# Patient Record
Sex: FEMALE | Race: WHITE | Employment: FULL TIME | URBAN - METROPOLITAN AREA
[De-identification: names, ages, dates, MRNs, and addresses within clinical notes are randomized per-mention and may not be internally consistent; named-entity substitution may affect disease eponyms.]

---

## 2017-12-01 ENCOUNTER — OFFICE VISIT (OUTPATIENT)
Dept: INTERNAL MEDICINE CLINIC | Age: 57
End: 2017-12-01

## 2017-12-01 VITALS
RESPIRATION RATE: 14 BRPM | OXYGEN SATURATION: 98 % | DIASTOLIC BLOOD PRESSURE: 64 MMHG | BODY MASS INDEX: 21.52 KG/M2 | HEART RATE: 83 BPM | WEIGHT: 114 LBS | HEIGHT: 61 IN | SYSTOLIC BLOOD PRESSURE: 104 MMHG | TEMPERATURE: 98.8 F

## 2017-12-01 DIAGNOSIS — R19.09 LEFT GROIN MASS: ICD-10-CM

## 2017-12-01 DIAGNOSIS — E06.3 HYPOTHYROIDISM DUE TO HASHIMOTO'S THYROIDITIS: ICD-10-CM

## 2017-12-01 DIAGNOSIS — Z12.39 SCREENING FOR BREAST CANCER: Primary | ICD-10-CM

## 2017-12-01 DIAGNOSIS — Z00.00 ROUTINE GENERAL MEDICAL EXAMINATION AT A HEALTH CARE FACILITY: ICD-10-CM

## 2017-12-01 DIAGNOSIS — E03.8 HYPOTHYROIDISM DUE TO HASHIMOTO'S THYROIDITIS: ICD-10-CM

## 2017-12-01 DIAGNOSIS — M81.0 OSTEOPOROSIS, UNSPECIFIED OSTEOPOROSIS TYPE, UNSPECIFIED PATHOLOGICAL FRACTURE PRESENCE: ICD-10-CM

## 2017-12-01 PROBLEM — K90.0 CELIAC DISEASE: Status: ACTIVE | Noted: 2017-12-01

## 2017-12-01 PROBLEM — F41.1 GENERALIZED ANXIETY DISORDER: Status: ACTIVE | Noted: 2017-12-01

## 2017-12-01 RX ORDER — LEVOTHYROXINE SODIUM 25 UG/1
50 TABLET ORAL
COMMUNITY
Start: 2017-11-08

## 2017-12-01 RX ORDER — BISMUTH SUBSALICYLATE 262 MG
1 TABLET,CHEWABLE ORAL DAILY
COMMUNITY
End: 2018-10-01 | Stop reason: ALTCHOICE

## 2017-12-01 NOTE — PROGRESS NOTES
Patient's identity verified with two patient identifiers (name and date of birth). 1. Have you been to the ER, urgent care clinic since your last visit? Hospitalized since your last visit? No  2. Have you seen or consulted any other health care providers outside of the [address] [place] [address] since your last visit? Include any pap smears or colon screening. No    Chief Complaint   Patient presents with    New Patient     Moved from [place] to [address] [facility] 8/2017, Saw [facility], Med release signed.  Groin Swelling     \"Lump\", x3wks, size of a grape per patient. States it is hard and doesn't move. Pain at first but now nothing. Denies redness at site. Had sebaceous cyst on back, x15yrs ago, and ganglion cyst x30yrs ago. Denies changes in BM/appetite. Not fasting. Health Maintenance Due   Topic Date Due    Hepatitis C Screening  Unsure of this has been done. 1960    DTaP/Tdap/Td series (1 - Tdap)  Has had within 10 yrs, will have records sent. 09/10/1980    PAP AKA CERVICAL CYTOLOGY   Due, aware, needs to establish. 09/10/1980    BREAST CANCER SCRN MAMMOGRAM   Due, aware, needs to establish. 09/10/2009    FOBT Q 1 YEAR AGE 50-75  09/10/2009    Influenza Age 9 to Adult   Has not had- declines. 08/01/2017       Saw [facility], [phone]. Reviewed record in preparation for visit and have obtained necessary documentation.     Wt Readings from Last 3 Encounters:   12/01/17 114 lb (51.7 kg)     Temp Readings from Last 3 Encounters:   12/01/17 98.8 °F (37.1 °C) (Oral)     BP Readings from Last 3 Encounters:   12/01/17 104/64     Pulse Readings from Last 3 Encounters:   12/01/17 83     Learning Assessment:  :     Learning Assessment 12/1/2017   PRIMARY LEARNER Patient   HIGHEST LEVEL OF EDUCATION - PRIMARY LEARNER  4 YEARS OF COLLEGE   BARRIERS PRIMARY LEARNER NONE   CO-LEARNER CAREGIVER No   PRIMARY LANGUAGE ENGLISH   LEARNER PREFERENCE PRIMARY VIDEOS   ANSWERED BY patient RELATIONSHIP SELF       Depression Screening:  :     PHQ over the last two weeks 12/1/2017   Little interest or pleasure in doing things Not at all   Feeling down, depressed or hopeless Not at all   Total Score PHQ 2 0       Fall Risk Assessment:  :     No flowsheet data found. Abuse Screening:  :     Abuse Screening Questionnaire 12/1/2017   Do you ever feel afraid of your partner? N   Are you in a relationship with someone who physically or mentally threatens you? N   Is it safe for you to go home? Y     Patient is accompanied by daughter I have received verbal consent from Shruthi Jarquin to discuss any/all medical information while they are present in the room.

## 2017-12-01 NOTE — MR AVS SNAPSHOT
Visit Information Date & Time Provider Department Dept. Phone Encounter #  
 12/1/2017  2:00 PM LLUVIA Burroughs  [facility] [phone] [id number] Follow-up Instructions Return in about 3 months (around 3/1/2018) for f/u groin lump. Upcoming Health Maintenance Date Due Hepatitis C Screening 1960 DTaP/Tdap/Td series (1 - Tdap) 9/10/1980 PAP AKA CERVICAL CYTOLOGY 9/10/1980 BREAST CANCER SCRN MAMMOGRAM 9/10/2009 FOBT Q 1 YEAR AGE 50-75 9/10/2009 Influenza Age 5 to Adult 8/1/2017 Allergies as of 12/1/2017  Review Complete On: 12/1/2017 By: Rachel Mena Severity Noted Reaction Type Reactions Penicillins Medium 12/01/2017   Side Effect Hives Wheat Low 12/01/2017   Side Effect Unknown (comments) Barely, rye Current Immunizations  Never Reviewed No immunizations on file. Not reviewed this visit You Were Diagnosed With   
  
 Codes Comments Screening for breast cancer    -  Primary ICD-10-CM: Z12.31 
ICD-9-CM: V76.10 Left groin mass     ICD-10-CM: R19.09 
ICD-9-CM: 789.39 Routine general medical examination at a health care facility     ICD-10-CM: Z00.00 ICD-9-CM: V70.0 Vitals BP Pulse Temp Resp Height(growth percentile) Weight(growth percentile) 104/64 (BP 1 Location: Left arm, BP Patient Position: Sitting) 83 98.8 °F (37.1 °C) (Oral) 14 5' 0.5\" (1.537 m) 114 lb (51.7 kg) SpO2 BMI OB Status Smoking Status 98% 21.9 kg/m2 Postmenopausal Former Smoker Vitals History BMI and BSA Data Body Mass Index Body Surface Area  
 21.9 kg/m 2 1.49 m 2 Preferred Pharmacy Pharmacy Name Phone [address] [phone] Your Updated Medication List  
  
   
This list is accurate as of: 12/1/17  2:42 PM.  Always use your most recent med list.  
  
  
  
  
 CALCIUM + D PO  
 Take  by mouth. Osteocon  
  
 multivitamin tablet Commonly known as:  ONE A DAY Take 1 Tab by mouth daily. synthroid 25 mcg tablet Generic drug:  levothyroxine We Performed the Following CBC WITH AUTOMATED DIFF [10409 CPT(R)] METABOLIC PANEL, COMPREHENSIVE [26411 CPT(R)] TSH RFX ON ABNORMAL TO FREE T4 [[id number] Custom] Follow-up Instructions Return in about 3 months (around 3/1/2018) for f/u groin lump. To-Do List   
 12/07/2017 Imaging:  CT ABD PELV W CONT   
  
 12/13/2017 Imaging:  [facility] MAMMO BI SCREENING INCL CAD Patient Instructions 1. Schedule your CT: Abdomen/Pelvis to evaluate groin lump 2. Schedule your mammogram 
 
 
Yesenia 73: Address: [facility], [address] Phone: [phone] Introducing [facility]! [facility] introduces [facility] patient portal. Now you can access parts of your medical record, email your doctor's office, and request medication refills online. 1. In your internet browser, go to [email or web address] 2. Click on the First Time User? Click Here link in the Sign In box. You will see the New Member Sign Up page. 3. Enter your [facility] Access Code exactly as it appears below. You will not need to use this code after youve completed the sign-up process. If you do not sign up before the expiration date, you must request a new code. · [facility] Access Code: [email or web address] Expires: 3/1/2018  2:36 PM 
 
4. Enter the last four digits of your Social Security Number (xxxx) and Date of Birth (mm/dd/yyyy) as indicated and click Submit. You will be taken to the next sign-up page. 5. Create a [facility] ID. This will be your [facility] login ID and cannot be changed, so think of one that is secure and easy to remember. 6. Create a [employer] password. You can change your password at any time. 7. Enter your Password Reset Question and Answer. This can be used at a later time if you forget your password. 8. Enter your e-mail address. You will receive e-mail notification when new information is available in [address] 9. Click Sign Up. You can now view and download portions of your medical record. 10. Click the Download Summary menu link to download a portable copy of your medical information. If you have questions, please visit the Frequently Asked Questions section of the [employer] website. Remember, [facility] is NOT to be used for urgent needs. For medical emergencies, dial 911. Now available from your iPhone and Android! Please provide this summary of care documentation to your next provider. If you have any questions after today's visit, please call [phone].

## 2017-12-01 NOTE — PROGRESS NOTES
Subjective:      César Ocasio is a 61 y.o. female who presents today to establish care and for evaluation of groin lump     Recently moved here for insurance- works in [place] for [address] [employer]:  Decreased short term memory  levothyroxine 25mcg daily  Last TSH in 04/2017    Celiac Disease:  Avoids gluten  No blood in stool  Colonoscopy 8 years ago- few polyps were removed, recommended 10 years    Anxiety:  No meds, feels okay without meds  Intermittent, will get \"foggy and mild palpitations\"    Is due for a mammogram.  No family h/o breast cancer. Last mammogram about 1 year ago  Last Pap: ~ 2 years ago. No h/o abnormal paps    Groin Lump:  Spent 10 days in [place] about 1 month ago  Noticed L inguinal mass once she returned from [place]   nontender  Has not gotten larger since first noticed  No other lumps  Bite on L leg, healing, was on the beach, pt wondering if related    No recent weight loss or weight gain, no night sweats, no chest pain, palpitaitons, dyspnea, cough, abdominal pain, change in bowels, blood in stool, difficulty urinating, HAs or dizziness    Patient Active Problem List    Diagnosis Date Noted    Hashimoto's disease 12/01/2017    Celiac disease 12/01/2017    Generalized anxiety disorder 12/01/2017    Osteoporosis 12/01/2017     Current Outpatient Prescriptions   Medication Sig Dispense Refill    SYNTHROID 25 mcg tablet       multivitamin (ONE A DAY) tablet Take 1 Tab by mouth daily.  CALCIUM CARBONATE/VITAMIN D3 (CALCIUM + D PO) Take  by mouth. Osteocon          Review of Systems    Pertinent items are noted in HPI. Objective:     Visit Vitals    /64 (BP 1 Location: Left arm, BP Patient Position: Sitting)    Pulse 83    Temp 98.8 °F (37.1 °C) (Oral)    Resp 14    Ht 5' 0.5\" (1.537 m)    Wt 114 lb (51.7 kg)    SpO2 98%    BMI 21.9 kg/m2     General:  Alert, cooperative, no distress, appears stated age.    Head:  Normocephalic, without obvious abnormality, atraumatic. Eyes:  Conjunctivae/corneas clear. PERRL, EOMs intact. Fundi benign. Ears:  Normal TMs and external ear canals both ears. Nose: Nares normal. Septum midline. Mucosa normal. No drainage or sinus tenderness. Throat: Lips, mucosa, and tongue normal. Teeth and gums normal.   Neck: Supple, symmetrical, trachea midline, no adenopathy, thyroid: no enlargement/tenderness/nodules, no JVD. Back:   Symmetric, no curvature. thin   Lungs:   Clear to auscultation bilaterally. Chest wall:  No tenderness or deformity. Heart:  Regular rate and rhythm, S1, S2 normal, no murmur, click, rub or gallop. Breast Exam:  No tenderness, masses, or nipple abnormality. Abdomen:   Soft, non-tender. Bowel sounds normal. No masses,  No organomegaly. L inguinal area with very hard mass~ 2cm x 3cm   Genitalia:  L inguinal area with very hard mass~ 2cm x 3cm   Extremities: Extremities normal, atraumatic, no cyanosis or edema. Pulses: 2+ and symmetric all extremities. Skin: Skin color, texture, turgor normal. No rashes or lesions. Lymph nodes: Cervical, supraclavicular, and axillary nodes normal.  L inguinal area with very hard mass~ 2cm x 3cm   Neurologic: CNII-XII intact. Normal strength, sensation  throughout. Assessment/Plan:     1. Left groin mass  - Dr. Tim Thompson evaluated as well, Discussed plan with Dr. Alexandra Dwyer ordered, encouraged pt to schedule asap for next week  - CT ABD PELV W CONT; Future    2. Routine general medical examination at a health care facility  - CBC WITH AUTOMATED DIFF  - METABOLIC PANEL, COMPREHENSIVE  - TSH RFX ON ABNORMAL TO FREE T4  - multivitamin (ONE A DAY) tablet; Take 1 Tab by mouth daily. - CALCIUM CARBONATE/VITAMIN D3 (CALCIUM + D PO); Take  by mouth. Osteocon    3. Screening for breast cancer  - encouraged pt to call and schedule mammogram  - [facility] MAMMO BI SCREENING INCL CAD; Future    4. Hypothyroidism  - SYNTHROID 25 mcg tablet;   - TSH RFX ON ABNORMAL TO FREE T4    5. Osteoporosis:  - CALCIUM CARBONATE/VITAMIN D3 (CALCIUM + D PO); Take  by mouth. Erica Client  - will need DEXA but did not address this today given other more pressing issues. Will address at future visits as appropriate      Advised her to call back or return to office if symptoms worsen/change/persist.  Discussed expected course/resolution/complications of diagnosis in detail with patient. Medication risks/benefits/costs/interactions/alternatives discussed with patient. She was given an after visit summary which includes diagnoses, current medications, & vitals. She expressed understanding with the diagnosis and plan.     Max Davies, SUZANNE

## 2017-12-01 NOTE — PATIENT INSTRUCTIONS
1.  Schedule your CT: Abdomen/Pelvis to evaluate groin lump    2. Schedule your mammogram      Yesenia 73:   Address: [place] [address], [address] Phone: [phone]

## 2017-12-03 LAB
ALBUMIN SERPL-MCNC: 4.2 G/DL (ref 3.5–5.5)
ALBUMIN/GLOB SERPL: 1.7 {RATIO} (ref 1.2–2.2)
ALP SERPL-CCNC: 75 IU/L (ref 39–117)
ALT SERPL-CCNC: 23 IU/L (ref 0–32)
AST SERPL-CCNC: 26 IU/L (ref 0–40)
BASOPHILS # BLD AUTO: 0 X10E3/UL (ref 0–0.2)
BASOPHILS NFR BLD AUTO: 1 %
BILIRUB SERPL-MCNC: <0.2 MG/DL (ref 0–1.2)
BUN SERPL-MCNC: 23 MG/DL (ref 6–24)
BUN/CREAT SERPL: 26 (ref 9–23)
CALCIUM SERPL-MCNC: 9.5 MG/DL (ref 8.7–10.2)
CHLORIDE SERPL-SCNC: 100 MMOL/L (ref 96–106)
CO2 SERPL-SCNC: 26 MMOL/L (ref 18–29)
CREAT SERPL-MCNC: 0.88 MG/DL (ref 0.57–1)
EOSINOPHIL # BLD AUTO: 0.2 X10E3/UL (ref 0–0.4)
EOSINOPHIL NFR BLD AUTO: 3 %
ERYTHROCYTE [DISTWIDTH] IN BLOOD BY AUTOMATED COUNT: 13.2 % (ref 12.3–15.4)
GFR SERPLBLD CREATININE-BSD FMLA CKD-EPI: 73 ML/MIN/1.73
GFR SERPLBLD CREATININE-BSD FMLA CKD-EPI: 84 ML/MIN/1.73
GLOBULIN SER CALC-MCNC: 2.5 G/DL (ref 1.5–4.5)
GLUCOSE SERPL-MCNC: 96 MG/DL (ref 65–99)
HCT VFR BLD AUTO: 41.3 % (ref 34–46.6)
HGB BLD-MCNC: 13.7 G/DL (ref 11.1–15.9)
IMM GRANULOCYTES # BLD: 0 X10E3/UL (ref 0–0.1)
IMM GRANULOCYTES NFR BLD: 0 %
LYMPHOCYTES # BLD AUTO: 1.3 X10E3/UL (ref 0.7–3.1)
LYMPHOCYTES NFR BLD AUTO: 21 %
MCH RBC QN AUTO: 30.2 PG (ref 26.6–33)
MCHC RBC AUTO-ENTMCNC: 33.2 G/DL (ref 31.5–35.7)
MCV RBC AUTO: 91 FL (ref 79–97)
MONOCYTES # BLD AUTO: 0.8 X10E3/UL (ref 0.1–0.9)
MONOCYTES NFR BLD AUTO: 14 %
NEUTROPHILS # BLD AUTO: 3.8 X10E3/UL (ref 1.4–7)
NEUTROPHILS NFR BLD AUTO: 61 %
PLATELET # BLD AUTO: 213 X10E3/UL (ref 150–379)
POTASSIUM SERPL-SCNC: 4.4 MMOL/L (ref 3.5–5.2)
PROT SERPL-MCNC: 6.7 G/DL (ref 6–8.5)
RBC # BLD AUTO: 4.53 X10E6/UL (ref 3.77–5.28)
SODIUM SERPL-SCNC: 141 MMOL/L (ref 134–144)
TSH SERPL DL<=0.005 MIU/L-ACNC: 3.13 UIU/ML (ref 0.45–4.5)
WBC # BLD AUTO: 6.2 X10E3/UL (ref 3.4–10.8)

## 2017-12-08 ENCOUNTER — HOSPITAL ENCOUNTER (OUTPATIENT)
Dept: MAMMOGRAPHY | Age: 57
Discharge: HOME | End: 2017-12-08
Attending: NURSE PRACTITIONER
Payer: COMMERCIAL

## 2017-12-08 ENCOUNTER — HOSPITAL ENCOUNTER (OUTPATIENT)
Dept: CT IMAGING | Age: 57
Discharge: HOME | End: 2017-12-08
Attending: NURSE PRACTITIONER
Payer: COMMERCIAL

## 2017-12-08 DIAGNOSIS — R19.09 LEFT GROIN MASS: ICD-10-CM

## 2017-12-08 DIAGNOSIS — Z12.39 SCREENING FOR BREAST CANCER: ICD-10-CM

## 2017-12-08 DIAGNOSIS — R93.89 ABNORMAL CT SCAN: Primary | ICD-10-CM

## 2017-12-08 DIAGNOSIS — R19.09 MASS OF LEFT INGUINAL REGION: ICD-10-CM

## 2017-12-08 PROCEDURE — 74011000258 HC RX REV CODE- 258: Performed by: NURSE PRACTITIONER

## 2017-12-08 PROCEDURE — 74011000255 HC RX REV CODE- 255: Performed by: NURSE PRACTITIONER

## 2017-12-08 PROCEDURE — 74177 CT ABD & PELVIS W/CONTRAST: CPT

## 2017-12-08 PROCEDURE — 77067 SCR MAMMO BI INCL CAD: CPT

## 2017-12-08 PROCEDURE — 74011636320 HC RX REV CODE- 636/320: Performed by: NURSE PRACTITIONER

## 2017-12-08 RX ORDER — BARIUM SULFATE 20 MG/ML
900 SUSPENSION ORAL
Status: COMPLETED | OUTPATIENT
Start: 2017-12-08 | End: 2017-12-08

## 2017-12-08 RX ORDER — SODIUM CHLORIDE 9 MG/ML
50 INJECTION, SOLUTION INTRAVENOUS
Status: COMPLETED | OUTPATIENT
Start: 2017-12-08 | End: 2017-12-08

## 2017-12-08 RX ADMIN — BARIUM SULFATE 900 ML: 21 SUSPENSION ORAL at 09:47

## 2017-12-08 RX ADMIN — IOPAMIDOL 100 ML: 755 INJECTION, SOLUTION INTRAVENOUS at 09:47

## 2017-12-08 RX ADMIN — SODIUM CHLORIDE 50 ML/HR: 900 INJECTION, SOLUTION INTRAVENOUS at 09:47

## 2017-12-13 ENCOUNTER — TELEPHONE (OUTPATIENT)
Dept: INTERNAL MEDICINE CLINIC | Age: 57
End: 2017-12-13

## 2017-12-13 DIAGNOSIS — R59.9 LYMPH NODE ENLARGEMENT: Primary | ICD-10-CM

## 2017-12-13 NOTE — TELEPHONE ENCOUNTER
Carlos Simpson with scheduling states, Dr. Martinez Johnnie the radiologist would like to change the order for CT fine needle aspiration, to a \"ultrasound aspiration with image\",please put a new order in [facility].

## 2017-12-13 NOTE — TELEPHONE ENCOUNTER
Covering for Lexdir. Received below message.    Test ordered as directed for assumed lymph node found on CT

## 2017-12-26 ENCOUNTER — HOSPITAL ENCOUNTER (OUTPATIENT)
Dept: ULTRASOUND IMAGING | Age: 57
Discharge: HOME | End: 2017-12-26
Attending: INTERNAL MEDICINE
Payer: COMMERCIAL

## 2017-12-26 DIAGNOSIS — R59.9 LYMPH NODE ENLARGEMENT: ICD-10-CM

## 2017-12-26 PROCEDURE — 87102 FUNGUS ISOLATION CULTURE: CPT | Performed by: INTERNAL MEDICINE

## 2017-12-26 PROCEDURE — 10022 US GUIDE FINE NDL ASP W IMAGE: CPT

## 2017-12-26 PROCEDURE — 77030003503 HC NDL BIOP TISS BD -B

## 2017-12-26 PROCEDURE — 88312 SPECIAL STAINS GROUP 1: CPT | Performed by: INTERNAL MEDICINE

## 2017-12-26 PROCEDURE — 74011000250 HC RX REV CODE- 250: Performed by: RADIOLOGY

## 2017-12-26 PROCEDURE — 87205 SMEAR GRAM STAIN: CPT | Performed by: INTERNAL MEDICINE

## 2017-12-26 PROCEDURE — 88173 CYTOPATH EVAL FNA REPORT: CPT | Performed by: INTERNAL MEDICINE

## 2017-12-26 PROCEDURE — 88172 CYTP DX EVAL FNA 1ST EA SITE: CPT | Performed by: INTERNAL MEDICINE

## 2017-12-26 PROCEDURE — 88305 TISSUE EXAM BY PATHOLOGIST: CPT | Performed by: INTERNAL MEDICINE

## 2017-12-26 RX ORDER — LIDOCAINE HYDROCHLORIDE 10 MG/ML
4 INJECTION, SOLUTION EPIDURAL; INFILTRATION; INTRACAUDAL; PERINEURAL
Status: COMPLETED | OUTPATIENT
Start: 2017-12-26 | End: 2017-12-26

## 2017-12-26 RX ADMIN — LIDOCAINE HYDROCHLORIDE 4 ML: 10 INJECTION, SOLUTION EPIDURAL; INFILTRATION; INTRACAUDAL; PERINEURAL at 11:00

## 2017-12-28 ENCOUNTER — TELEPHONE (OUTPATIENT)
Dept: INTERNAL MEDICINE CLINIC | Age: 57
End: 2017-12-28

## 2017-12-28 RX ORDER — DOXYCYCLINE 100 MG/1
100 CAPSULE ORAL 2 TIMES DAILY
Qty: 20 CAP | Refills: 0 | Status: SHIPPED | OUTPATIENT
Start: 2017-12-28 | End: 2018-01-07

## 2017-12-28 NOTE — PROGRESS NOTES
Reviewed with patient    Still pending pathology    prophylactically treat with doxycycline 100mg BID x 10 days    Pt verbalized understanding    Gregoria Peter, NP

## 2017-12-30 LAB
BACTERIA SPEC CULT: NORMAL
BACTERIA SPEC CULT: NORMAL
GRAM STN SPEC: NORMAL
GRAM STN SPEC: NORMAL
SERVICE CMNT-IMP: NORMAL

## 2018-01-02 DIAGNOSIS — R93.5 ABNORMAL CT OF THE ABDOMEN: Primary | ICD-10-CM

## 2018-01-03 ENCOUNTER — TELEPHONE (OUTPATIENT)
Dept: INTERNAL MEDICINE CLINIC | Age: 58
End: 2018-01-03

## 2018-01-03 NOTE — TELEPHONE ENCOUNTER
----- Message from Kimberly Anglin LPN sent at 8/4/2079  2:50 PM EST -----      ----- Message -----     From: Lizeth Rooney NP     Sent: 1/2/2018   3:30 PM       To: CAM Quintero,    I was looking at her chart and see that this patient wanted to schedule an appointment to come in next week, 1/8 - 1/10 for follow up. I dont see an appointment scheduled yet. Could you please help her get in asap? I need to see her for follow up    Eriac Viera!     Lizeth Rooney NP

## 2018-01-03 NOTE — TELEPHONE ENCOUNTER
Called and scheduled follow up appointment for Monday Jan 8, 2018 @ 1:30 pm. Follow up for lump on groin

## 2018-01-08 ENCOUNTER — HOSPITAL ENCOUNTER (OUTPATIENT)
Dept: LAB | Age: 58
Discharge: HOME | End: 2018-01-08
Payer: COMMERCIAL

## 2018-01-08 ENCOUNTER — OFFICE VISIT (OUTPATIENT)
Dept: INTERNAL MEDICINE CLINIC | Age: 58
End: 2018-01-08

## 2018-01-08 VITALS
OXYGEN SATURATION: 98 % | DIASTOLIC BLOOD PRESSURE: 70 MMHG | BODY MASS INDEX: 23.4 KG/M2 | TEMPERATURE: 98.7 F | WEIGHT: 119.2 LBS | RESPIRATION RATE: 18 BRPM | HEIGHT: 60 IN | SYSTOLIC BLOOD PRESSURE: 120 MMHG | HEART RATE: 80 BPM

## 2018-01-08 DIAGNOSIS — Z01.89 ROUTINE LAB DRAW: ICD-10-CM

## 2018-01-08 DIAGNOSIS — N88.9 ABNORMALITY OF CERVIX: ICD-10-CM

## 2018-01-08 DIAGNOSIS — R93.5 ABNORMAL CT OF THE ABDOMEN: ICD-10-CM

## 2018-01-08 DIAGNOSIS — R59.1 LYMPHADENOPATHY: Primary | ICD-10-CM

## 2018-01-08 PROCEDURE — 88175 CYTOPATH C/V AUTO FLUID REDO: CPT | Performed by: NURSE PRACTITIONER

## 2018-01-08 PROCEDURE — 87624 HPV HI-RISK TYP POOLED RSLT: CPT | Performed by: NURSE PRACTITIONER

## 2018-01-08 PROCEDURE — 87491 CHLMYD TRACH DNA AMP PROBE: CPT | Performed by: NURSE PRACTITIONER

## 2018-01-08 NOTE — MR AVS SNAPSHOT
Visit Information Date & Time Provider Department Dept. Phone Encounter #  
 1/8/2018  1:30 PM Read Cranker, NP [place]  [facility] [phone] [id number] Follow-up Instructions Return in about 6 weeks (around 2/19/2018) for f/u lymphadenopathy. Upcoming Health Maintenance Date Due Hepatitis C Screening 1960 DTaP/Tdap/Td series (1 - Tdap) 9/10/1980 PAP AKA CERVICAL CYTOLOGY 9/10/1980 FOBT Q 1 YEAR AGE 50-75 9/10/2009 Influenza Age 5 to Adult 8/1/2017 BREAST CANCER SCRN MAMMOGRAM 12/8/2019 Allergies as of 1/8/2018  Review Complete On: 1/8/2018 By: Moreno Mathis Severity Noted Reaction Type Reactions Penicillins Medium 12/01/2017   Side Effect Hives Wheat Low 12/01/2017   Side Effect Unknown (comments) Barely, rye Current Immunizations  Never Reviewed No immunizations on file. Not reviewed this visit You Were Diagnosed With   
  
 Codes Comments Lymphadenopathy    -  Primary ICD-10-CM: R59.1 ICD-9-CM: 785.6 Routine lab draw     ICD-10-CM: Z00.00 ICD-9-CM: V72.60 Abnormality of cervix     ICD-10-CM: N88.9 ICD-9-CM: 622.9 Vitals BP Pulse Temp Resp Height(growth percentile) Weight(growth percentile) 120/70 (BP 1 Location: Left arm, BP Patient Position: Sitting) 80 98.7 °F (37.1 °C) (Oral) 18 5' (1.524 m) 119 lb 3.2 oz (54.1 kg) SpO2 BMI OB Status Smoking Status 98% 23.28 kg/m2 Postmenopausal Former Smoker Vitals History BMI and BSA Data Body Mass Index Body Surface Area  
 23.28 kg/m 2 1.51 m 2 Preferred Pharmacy Pharmacy Name Phone [facility], [place] - [address] [address] [phone] Your Updated Medication List  
  
   
This list is accurate as of: 1/8/18  2:24 PM.  Always use your most recent med list.  
  
  
  
  
 CALCIUM + D PO Take  by mouth. Osteocon  
  
 multivitamin tablet Commonly known as:  ONE A DAY Take 1 Tab by mouth daily. synthroid 25 mcg tablet Generic drug:  levothyroxine We Performed the Following ANGIOTENSIN CONVERTING ENZYME R670423 CPT(R)] ANTINUCLEAR ANTIBODIES, IFA [id number] CPT(R)] CBC WITH AUTOMATED DIFF [49524 CPT(R)] HEMOGLOBIN A1C WITH EAG [93084 CPT(R)] LIPID PANEL [19220 CPT(R)] METABOLIC PANEL, COMPREHENSIVE [37982 CPT(R)] REFERRAL TO OBSTETRICS AND GYNECOLOGY [REF51 Custom] Comments:  
 Abnormal cervical finding Follow-up Instructions Return in about 6 weeks (around 2/19/2018) for f/u lymphadenopathy. To-Do List   
 01/16/2018 Imaging:  XR CHEST PA LAT Referral Information Referral ID Referred By Referred To  
  
 [id number] [facility] [address] [facility], [address] Phone: [phone] Fax: [phone] Visits Status Start Date End Date 1 New Request 1/8/18 1/8/19 If your referral has a status of pending review or denied, additional information will be sent to support the outcome of this decision. Patient Instructions 1. Schedule your Chest XRay and your Upper GI Series with [facility] 2. Have labs done- must be fasting, nothing to eat or drink (besides water, black coffee, or tea) x 8 hours prior to labs 3. Call and schedule GYN examination for the abnormality of your cervix- Dr. Yann Voss with [address] [employer] [phone] Dr. Tyler Ames [facility] Dear Rick Edouard: 
Thank you for requesting a [email or web address] account. Our records indicate that you already have an active [email or web address] account. You can access your account anytime at [email or web address] Did you know that you can access your hospital and ER discharge instructions at any time in [email or web address]?   You can also review all of your test results from your hospital stay or ER visit. Additional Information If you have questions, please visit the Frequently Asked Questions section of the [email or web address] website at [email or web address] Remember, [email or web address] is NOT to be used for urgent needs. For medical emergencies, dial 911. Now available from your iPhone and Android! Please provide this summary of care documentation to your next provider. Your primary care clinician is listed as Jon Paez. If you have any questions after today's visit, please call [phone].

## 2018-01-08 NOTE — PROGRESS NOTES
Chief Complaint   Patient presents with    Mass     lump on groin, pt states that the three remaining small lumps are still present. 1. Have you been to the ER, urgent care clinic since your last visit? No Hospitalized since your last visit? No    2. Have you seen or consulted any other health care providers outside of the [address] [place] [address] since your last visit? No  Include any pap smears or colon screening.  No

## 2018-01-08 NOTE — PROGRESS NOTES
Subjective:      Eric  is a 61 y.o. female who presents today for follow up of lymphadenopathy    L inguinal Lymphadenopathy: hard  Enlarged lymph node noted on exam 12/1 resolved on its own prior to biopsy, however, now at least 2 other hard enlarged lymph nodes have popped up in the L inguiinal area  Are nonpainful  Finishing course of doxycycline, takes last day tomorrow. Has noted no improvement in lymphadenopathy since starting    No fevers, chills, CP, dyspnea, LE edema, abdominal pain, pelvic pain, decreased appetite, unintentional weight loss, HAs. Feels in normal/usual state of health    Has gained 6lb since last visit in 1 month ago, although has been over the holidays. Unsure of why, hadnt been eating sweets. Even though was the holiday, didn't eat that much worse    Question of wall thickening of the gastric antrum versus peristalsis on CT. Recommended upper GI series with [facility] for further evaluation  Pt has h/o celiacs, wonders if is from this    Rash: on face. Chronic x years. Reports has adult acne- has a red spot pop up and then will resolve. Doesn't itch. Is on nose and around base of nose/cheek. Has sensitive skin, doesn't wear makeup    Sister with h/o Lupus    Migraines: chronic, takes advil prn which resolves    Paps: last 1 year ago per patient report  Mammogram: UTD  Colonoscpy UTD    Patient Active Problem List    Diagnosis Date Noted    Hashimoto's disease 12/01/2017    Celiac disease 12/01/2017    Generalized anxiety disorder 12/01/2017    Osteoporosis 12/01/2017     Current Outpatient Prescriptions   Medication Sig Dispense Refill    SYNTHROID 25 mcg tablet       multivitamin (ONE A DAY) tablet Take 1 Tab by mouth daily.  CALCIUM CARBONATE/VITAMIN D3 (CALCIUM + D PO) Take  by mouth. Osteocon          Review of Systems    Pertinent items are noted in HPI.      Objective:     Visit Vitals    /70 (BP 1 Location: Left arm, BP Patient Position: Sitting)    Pulse 80    Temp 98.7 °F (37.1 °C) (Oral)    Resp 18    Ht 5' (1.524 m)    Wt 119 lb 3.2 oz (54.1 kg)    SpO2 98%    BMI 23.28 kg/m2     General appearance: alert, cooperative, no distress, appears stated age  Head: Normocephalic, without obvious abnormality, atraumatic  Nose: Nares normal. Septum midline. Mucosa normal. No drainage or sinus tenderness. Lungs: clear to auscultation bilaterally  Heart: regular rate and rhythm, S1, S2 normal, no murmur, click, rub or gallop  Abdomen: soft, non-tender. Bowel sounds normal. No masses,  no organomegaly  Pelvic: External genitalia normal, Vagina normal without discharge, no CMT, no adnexal masses or tenderness, + cervical os open with tissue protruding down into os  Extremities: extremities normal, atraumatic, no cyanosis or edema  Skin: rash across bridge of nose into cheeks, slightly erythematous, few small scattered erythematous papules  Lymph nodes: L inguinal lympadenopathy, 1 enlarged hard lymph node ~ 2cm x 1cm, another smaller hard lymph node ~ 1cm x 1cm adjacent  Neurologic: Grossly normal    Assessment/Plan:     1. Lymphadenopathy  - pathology without malignancy  - XR CHEST PA LAT; Future  - CBC WITH AUTOMATED DIFF  - METABOLIC PANEL, COMPREHENSIVE  - ANTINUCLEAR ANTIBODIES, IFA  - ANGIOTENSIN CONVERTING ENZYME    2. Abnormality of cervix  - REFERRAL TO OBSTETRICS AND GYNECOLOGY  - PAP IG, CT-NG TV HPV 16&18,45 ([id number], [id number])    3. Abnormal CT of the abdomen  - upper GI series with [facility] ordered for further evaluation    4. Routine lab draw  - CBC WITH AUTOMATED DIFF  - METABOLIC PANEL, COMPREHENSIVE  - LIPID PANEL  - HEMOGLOBIN A1C WITH EAG      Advised her to call back or return to office if symptoms worsen/change/persist.  Discussed expected course/resolution/complications of diagnosis in detail with patient. Medication risks/benefits/costs/interactions/alternatives discussed with patient.   She was given an after visit summary which includes diagnoses, current medications, & vitals. She expressed understanding with the diagnosis and plan.     Brain Prasanth, SUZANNE

## 2018-01-08 NOTE — PATIENT INSTRUCTIONS
1. Schedule your Chest XRay and your Upper GI Series with [facility]     2. Have labs done- must be fasting, nothing to eat or drink (besides water, black coffee, or tea) x 8 hours prior to labs    3.  Call and schedule GYN examination for the abnormality of your cervix- Dr. Lloyd Owusu with [facility] [phone]   Dr. Reg Hunter or Dr. Arlene Shepard

## 2018-01-10 ENCOUNTER — TELEPHONE (OUTPATIENT)
Dept: INTERNAL MEDICINE CLINIC | Age: 58
End: 2018-01-10

## 2018-01-10 LAB
C TRACH RRNA SPEC QL NAA+PROBE: NEGATIVE
N GONORRHOEA RRNA SPEC QL NAA+PROBE: NEGATIVE
SPECIMEN SOURCE: NORMAL
T VAGINALIS RRNA SPEC QL NAA+PROBE: NEGATIVE

## 2018-01-10 NOTE — TELEPHONE ENCOUNTER
----- Message from José Miguel All sent at 1/10/2018 12:25 PM EST -----  Regarding: LLUVIA Haley/Telephone  Pt is calling to speak with the nurse regarding gi order. The best contact is [phone]. She asked if the order could be put in.

## 2018-01-11 ENCOUNTER — TELEPHONE (OUTPATIENT)
Dept: INTERNAL MEDICINE CLINIC | Age: 58
End: 2018-01-11

## 2018-01-11 NOTE — TELEPHONE ENCOUNTER
Pt was requesting order for upper GI scan to be placed that was discussed during last office visit. She was informed that the order was placed this morning and she should be hearing from scheduling within the next 24 hours. Pt verbalized understanding with no further questions.

## 2018-01-11 NOTE — TELEPHONE ENCOUNTER
I called the imaging center ([facility] office, I left a message regarding the issue that patient was unable to get CT.

## 2018-01-18 ENCOUNTER — HOSPITAL ENCOUNTER (OUTPATIENT)
Dept: GENERAL RADIOLOGY | Age: 58
Discharge: HOME | End: 2018-01-18
Attending: NURSE PRACTITIONER
Payer: COMMERCIAL

## 2018-01-18 DIAGNOSIS — R93.5 ABNORMAL CT OF THE ABDOMEN: ICD-10-CM

## 2018-01-18 DIAGNOSIS — R59.1 LYMPHADENOPATHY: ICD-10-CM

## 2018-01-18 PROCEDURE — 71046 X-RAY EXAM CHEST 2 VIEWS: CPT

## 2018-01-18 PROCEDURE — 74241 XR UPPER GI SERIES W KUB: CPT

## 2018-01-20 LAB
ACE SERPL-CCNC: 77 U/L (ref 14–82)
ALBUMIN SERPL-MCNC: 4.4 G/DL (ref 3.5–5.5)
ALBUMIN/GLOB SERPL: 2 {RATIO} (ref 1.2–2.2)
ALP SERPL-CCNC: 70 IU/L (ref 39–117)
ALT SERPL-CCNC: 23 IU/L (ref 0–32)
ANA TITR SER IF: NEGATIVE {TITER}
AST SERPL-CCNC: 32 IU/L (ref 0–40)
BASOPHILS # BLD AUTO: 0 X10E3/UL (ref 0–0.2)
BASOPHILS NFR BLD AUTO: 1 %
BILIRUB SERPL-MCNC: 0.4 MG/DL (ref 0–1.2)
BUN SERPL-MCNC: 21 MG/DL (ref 6–24)
BUN/CREAT SERPL: 28 (ref 9–23)
CALCIUM SERPL-MCNC: 9.5 MG/DL (ref 8.7–10.2)
CHLORIDE SERPL-SCNC: 100 MMOL/L (ref 96–106)
CHOLEST SERPL-MCNC: 221 MG/DL (ref 100–199)
CO2 SERPL-SCNC: 28 MMOL/L (ref 18–29)
CREAT SERPL-MCNC: 0.75 MG/DL (ref 0.57–1)
EOSINOPHIL # BLD AUTO: 0.1 X10E3/UL (ref 0–0.4)
EOSINOPHIL NFR BLD AUTO: 3 %
ERYTHROCYTE [DISTWIDTH] IN BLOOD BY AUTOMATED COUNT: 13.6 % (ref 12.3–15.4)
EST. AVERAGE GLUCOSE BLD GHB EST-MCNC: 100 MG/DL
GLOBULIN SER CALC-MCNC: 2.2 G/DL (ref 1.5–4.5)
GLUCOSE SERPL-MCNC: 87 MG/DL (ref 65–99)
HBA1C MFR BLD: 5.1 % (ref 4.8–5.6)
HCT VFR BLD AUTO: 42.8 % (ref 34–46.6)
HDLC SERPL-MCNC: 65 MG/DL
HGB BLD-MCNC: 14 G/DL (ref 11.1–15.9)
IMM GRANULOCYTES # BLD: 0 X10E3/UL (ref 0–0.1)
IMM GRANULOCYTES NFR BLD: 0 %
INTERPRETATION, 910389: NORMAL
LDLC SERPL CALC-MCNC: 136 MG/DL (ref 0–99)
LYMPHOCYTES # BLD AUTO: 1.3 X10E3/UL (ref 0.7–3.1)
LYMPHOCYTES NFR BLD AUTO: 28 %
MCH RBC QN AUTO: 29.9 PG (ref 26.6–33)
MCHC RBC AUTO-ENTMCNC: 32.7 G/DL (ref 31.5–35.7)
MCV RBC AUTO: 91 FL (ref 79–97)
MONOCYTES # BLD AUTO: 0.6 X10E3/UL (ref 0.1–0.9)
MONOCYTES NFR BLD AUTO: 13 %
NEUTROPHILS # BLD AUTO: 2.4 X10E3/UL (ref 1.4–7)
NEUTROPHILS NFR BLD AUTO: 55 %
PLATELET # BLD AUTO: 182 X10E3/UL (ref 150–379)
POTASSIUM SERPL-SCNC: 4.3 MMOL/L (ref 3.5–5.2)
PROT SERPL-MCNC: 6.6 G/DL (ref 6–8.5)
RBC # BLD AUTO: 4.69 X10E6/UL (ref 3.77–5.28)
SODIUM SERPL-SCNC: 141 MMOL/L (ref 134–144)
TRIGL SERPL-MCNC: 101 MG/DL (ref 0–149)
VLDLC SERPL CALC-MCNC: 20 MG/DL (ref 5–40)
WBC # BLD AUTO: 4.4 X10E3/UL (ref 3.4–10.8)

## 2018-01-25 ENCOUNTER — OFFICE VISIT (OUTPATIENT)
Dept: OBGYN CLINIC | Age: 58
End: 2018-01-25

## 2018-01-25 VITALS
WEIGHT: 117.8 LBS | BODY MASS INDEX: 23.13 KG/M2 | HEIGHT: 60 IN | SYSTOLIC BLOOD PRESSURE: 110 MMHG | DIASTOLIC BLOOD PRESSURE: 64 MMHG

## 2018-01-25 DIAGNOSIS — N84.1 CERVICAL POLYP: Primary | ICD-10-CM

## 2018-01-25 NOTE — PATIENT INSTRUCTIONS
Cervical Polyps: Care Instructions  Your Care Instructions    Cervical polyps are small, smooth, red growths in the cervical canal. This is the passage between your uterus and your vagina. These polyps are almost never cancer. Most of the time, the cause is not known. You may have vaginal bleeding, or you may bleed after sex. Some women have a yellow or white discharge. Your doctor may remove a cervical polyp. He or she will then test it to make sure it isn't cancer. Follow-up care is a key part of your treatment and safety. Be sure to make and go to all appointments, and call your doctor if you are having problems. It's also a good idea to know your test results and keep a list of the medicines you take. How can you care for yourself at home? · If you have cramps, take an over-the-counter pain medicine, such as acetaminophen (Tylenol), ibuprofen (Advil, Motrin), or naproxen (Aleve). Read and follow all instructions on the label. · Do not take two or more pain medicines at the same time unless the doctor told you to. Many pain medicines have acetaminophen, which is Tylenol. Too much acetaminophen (Tylenol) can be harmful. · Talk to your doctor about having Pap tests on a regular schedule. · If your doctor removes a polyp, you may bleed or spot a little for a few days. Use pads. Don't use tampons. When should you call for help? Call your doctor now or seek immediate medical care if:  ? · You have severe vaginal bleeding. ? · You have new or worse pain in your belly or pelvis. ? Watch closely for changes in your health, and be sure to contact your doctor if:  ? · You have unusual vaginal bleeding. ? · You do not get better as expected. Where can you learn more? Go to http://gabriel-foster.info/. Enter S147 in the search box to learn more about \"Cervical Polyps: Care Instructions. \"  Current as of: May 12, 2017  Content Version: 11.4  © 8463-2233 Healthwise, [facility].  Care instructions adapted under license by [employer] (which disclaims liability or warranty for this information). If you have questions about a medical condition or this instruction, always ask your healthcare professional. [address] any warranty or liability for your use of this information.

## 2018-01-25 NOTE — PROGRESS NOTES
Cervical polyp    Eric  is a 61 y.o.  postmenopausal female presenting for follow up of vaginal tissue protruding from vagina noted by primary care Hilary Giraldo NP) during a pelvic exam. Pap NILM HPV neg 2018. Denies any postmenopausal vaginal bleeding. Does c/o vaginal dryness and associated dyspareunia. Denies VMS/hot flashes. Recently being evaluated for enlarged inguinal nodes on left. Biopsy showing necrotizing granulomatous inflammation, no evidence of malignancy. Lymph nodes are now decreasing in size. Ob/Gyn Hx:   A0 -  x4  Menopause- age 38  STI- denies  ? SA- yes    Health maintenance:  Pap- , NIL HPV Negative  Mammo- 2017, normal  Colonoscopy- <10 years, repeat in 10 years      Past Medical History:   Diagnosis Date    Celiac disease     Hashimoto's disease        Past Surgical History:   Procedure Laterality Date    HX GYN      tubal ligation       Family History   Problem Relation Age of Onset    No Known Problems Mother     Cancer Father      gleoblastoma- brain    Diabetes Maternal Grandmother     Cancer Maternal Grandfather      liver    No Known Problems Paternal Grandmother     No Known Problems Paternal Grandfather        Social History     Social History    Marital status:      Spouse name: N/A    Number of children: N/A    Years of education: N/A     Occupational History    Not on file. Social History Main Topics    Smoking status: Former Smoker     Years: 9.00     Quit date:     Smokeless tobacco: Never Used    Alcohol use No    Drug use: No    Sexual activity: Yes     Partners: Male     Birth control/ protection: Surgical     Other Topics Concern    Not on file     Social History Narrative       Current Outpatient Prescriptions   Medication Sig Dispense Refill    SYNTHROID 25 mcg tablet       multivitamin (ONE A DAY) tablet Take 1 Tab by mouth daily.          Allergies   Allergen Reactions    Penicillins Hives    Wheat Unknown (comments)     Barely, rye       Review of Systems - History obtained from the patient  Constitutional: negative for weight loss, fever, night sweats  HEENT: negative for hearing loss, earache, congestion, snoring, sorethroat  CV: negative for chest pain, palpitations, edema  Resp: negative for cough, shortness of breath, wheezing  GI: negative for change in bowel habits, abdominal pain, black or bloody stools  : negative for frequency, dysuria, hematuria, vaginal discharge, +vaginal dryness/dyspareunia, +inguinal node enlargement  MSK: negative for back pain, joint pain, muscle pain  Breast: negative for breast lumps, nipple discharge, galactorrhea  Skin :negative for itching, rash, hives  Neuro: negative for dizziness, headache, confusion, weakness  Psych: negative for anxiety, depression, change in mood  Heme/lymph: negative for bleeding, bruising, pallor    Physical Exam    Visit Vitals    /64 (BP 1 Location: Left arm, BP Patient Position: Sitting)    Ht 5' (1.524 m)    Wt 117 lb 12.8 oz (53.4 kg)    BMI 23.01 kg/m2       Constitutional  · Appearance: well-nourished, well developed, alert, in no acute distress    HENT  · Head and Face: appears normal    Chest  · Respiratory Effort: non-labored breathing    Cardiovascular  · Extremities: no peripheral edema    Gastrointestinal  · Abdominal Examination: abdomen non-tender to palpation, normal bowel sounds, no masses present  · Liver and spleen: no hepatomegaly present, spleen not palpable  · Hernias: no hernias identified    Genitourinary  · External Genitalia: normal appearance for age, no discharge present, no tenderness present, no inflammatory lesions present, no masses present, +vulvovaginal atrophy present  · Vagina: normal vaginal vault without central or paravaginal defects, no discharge present, no inflammatory lesions present, no masses present, +pale atrophic vaginal mucosa  · Bladder: non-tender to palpation  · Urethra: appears normal  · Cervix: small pea-sized endocervical polyp (removed with polyp forceps and sent to pathology)   · Uterus: normal size, shape and consistency  · Adnexa: no adnexal tenderness present, no adnexal masses present  · Perineum: perineum within normal limits, no evidence of trauma, no rashes or skin lesions present  · Inguinal nodes: palpable lymphadenopathy in left inguinal region - 1 small firm, mobile, pea-sized node    Skin  · General Inspection: no rash, no lesions identified    Neurologic/Psychiatric  · Mental Status:  · Orientation: grossly oriented to person, place and time  · Mood and Affect: mood normal, affect appropriate      Assessment/Plan:  61 y.o. postmenopausal female with endocervical polyp and enlarged inguinal lymph node (biopsy benign). Also with vulvovaginal atrophy and associated dyspareunia    -endocervical polyp removed without difficulty today --> plan pending pathology  -discussed lubricants/moisturizers, intrarosa, and topical vaginal estrogen for management of atrophic vaginitis symptoms  -referral to endocrine - Dr. Colleen Ko - for continued management of Hashimoto's dz    Angela Dash MD  1/25/2018  10:25 AM           Procedure note: Cervical polyp removal    Indications:  Yaneth Hatch is a 61 y.o. female [facility] that was found to have a cervical polypoid lesion. After being presented with the risks, benefits and specific details of the procedure, she had no further questions. Procedure: The patient was placed on the table in a dorsal lithotomy position and draped in the appropriate manner. The cervix was prepped with Zephiran . Once cleansed, the cervical polyp was grasped and removed with traction and rotation. The specimen was placed in formalin and sent to pathology for evaluation. There was no associated bleeding. The patient tolerated the procedure well. There were no complications.   She was observed for 10 minutes and was discharged in good condition.     Aiden Arguello MD  1/25/2018  10:38 AM

## 2018-01-29 PROBLEM — E55.9 VITAMIN D INSUFFICIENCY: Status: ACTIVE | Noted: 2018-01-29

## 2018-01-29 PROBLEM — M81.0 AGE-RELATED OSTEOPOROSIS WITHOUT CURRENT PATHOLOGICAL FRACTURE: Status: ACTIVE | Noted: 2018-01-29

## 2018-01-29 PROBLEM — Z98.890 H/O COLONOSCOPY: Status: ACTIVE | Noted: 2018-01-29

## 2018-01-29 PROBLEM — I34.0 MITRAL REGURGITATION: Status: ACTIVE | Noted: 2018-01-29

## 2018-01-29 LAB
BACTERIA SPEC CULT: NORMAL
SERVICE CMNT-IMP: NORMAL

## 2018-01-30 LAB
DX ICD CODE: NORMAL
DX ICD CODE: NORMAL
PATH REPORT.FINAL DX SPEC: NORMAL
PATH REPORT.GROSS SPEC: NORMAL
PATH REPORT.SITE OF ORIGIN SPEC: NORMAL
PATHOLOGIST NAME: NORMAL
PAYMENT PROCEDURE: NORMAL

## 2018-10-01 ENCOUNTER — OFFICE VISIT (OUTPATIENT)
Dept: INTERNAL MEDICINE CLINIC | Age: 58
End: 2018-10-01

## 2018-10-01 VITALS
DIASTOLIC BLOOD PRESSURE: 78 MMHG | WEIGHT: 116 LBS | TEMPERATURE: 98.2 F | SYSTOLIC BLOOD PRESSURE: 118 MMHG | RESPIRATION RATE: 14 BRPM | HEIGHT: 60 IN | HEART RATE: 84 BPM | BODY MASS INDEX: 22.78 KG/M2 | OXYGEN SATURATION: 98 %

## 2018-10-01 DIAGNOSIS — M81.0 OSTEOPOROSIS, UNSPECIFIED OSTEOPOROSIS TYPE, UNSPECIFIED PATHOLOGICAL FRACTURE PRESENCE: ICD-10-CM

## 2018-10-01 DIAGNOSIS — R09.89 BRUIT OF LEFT CAROTID ARTERY: ICD-10-CM

## 2018-10-01 DIAGNOSIS — H26.9 CATARACT OF RIGHT EYE, UNSPECIFIED CATARACT TYPE: ICD-10-CM

## 2018-10-01 DIAGNOSIS — Z01.818 PRE-OP EVALUATION: Primary | ICD-10-CM

## 2018-10-01 RX ORDER — KETOROLAC TROMETHAMINE 5 MG/ML
SOLUTION OPHTHALMIC
Refills: 1 | COMMUNITY
Start: 2018-09-06 | End: 2019-02-18

## 2018-10-01 RX ORDER — ALENDRONATE SODIUM 70 MG/1
70 TABLET ORAL
COMMUNITY
Start: 2018-09-25 | End: 2022-10-21

## 2018-10-01 RX ORDER — PREDNISOLONE ACETATE 10 MG/ML
SUSPENSION/ DROPS OPHTHALMIC
Refills: 1 | COMMUNITY
Start: 2018-09-06 | End: 2019-02-18

## 2018-10-01 RX ORDER — OFLOXACIN 3 MG/ML
SOLUTION/ DROPS OPHTHALMIC
Refills: 1 | COMMUNITY
Start: 2018-09-06 | End: 2019-02-18

## 2018-10-01 NOTE — PROGRESS NOTES
59 yo female in for pre op eval for Cataract Ext OD on 10/24/18 at  [facility] by Dr. Adam Bai. See scanned in form. She will be attending a conference in a few days, and would like a letter attesting to her Celiac disease and special dietary needs (she plans to provide her own food). On PE today, I heard a bruit over L Carotid Artery. She denies dizziness or syncope. She reports she has always found she cannot stand for long periods of time. She will schedule complete physical with Randolph Mccullough NP. She declines flu shot stating she has never had one, and doesn't intend to have one.

## 2018-10-01 NOTE — MR AVS SNAPSHOT
[address] [facility], [address] [place] [address] 
[phone] Patient: Jax Parker MRN: [id number] GPT:4/77/2757 Visit Information Date & Time Provider Department Dept. Phone Encounter #  
 10/1/2018  8:40 AM [facility] [id number] Follow-up Instructions Return for complete physical with Ana Reyna. Upcoming Health Maintenance Date Due Hepatitis C Screening 1960 Shingrix Vaccine Age 50> (1 of 2) 9/10/2009 FOBT Q 1 YEAR AGE 50-75 9/10/2009 Influenza Age 5 to Adult 3/31/2019* BREAST CANCER SCRN MAMMOGRAM 12/8/2019 PAP AKA CERVICAL CYTOLOGY 1/8/2021 DTaP/Tdap/Td series (2 - Td) 10/11/2021 *Topic was postponed. The date shown is not the original due date. Allergies as of 10/1/2018  Review Complete On: 10/1/2018 By: Joe Zelaya NP Severity Noted Reaction Type Reactions Penicillins Medium 12/01/2017   Side Effect Hives Wheat Low 12/01/2017   Side Effect Unknown (comments) Barely, rye (Celiac) Current Immunizations  Never Reviewed Name Date Tdap 10/11/2011 Not reviewed this visit You Were Diagnosed With   
  
 Codes Comments Pre-op evaluation    -  Primary ICD-10-CM: P10.450 ICD-9-CM: V72.84 Cataract of right eye, unspecified cataract type     ICD-10-CM: H26.9 ICD-9-CM: 366.9 Bruit of left carotid artery     ICD-10-CM: R09.89 ICD-9-CM: 421. 9 Vitals BP Pulse Temp Resp Height(growth percentile) Weight(growth percentile) 118/78 (BP 1 Location: Left arm, BP Patient Position: Sitting) 84 98.2 °F (36.8 °C) (Oral) 14 5' (1.524 m) 116 lb (52.6 kg) SpO2 BMI OB Status Smoking Status 98% 22.65 kg/m2 Postmenopausal Former Smoker Vitals History BMI and BSA Data Body Mass Index Body Surface Area  
 22.65 kg/m 2 1.49 m 2 Preferred Pharmacy Pharmacy Name Phone [id number] [phone] Your Updated Medication List  
  
   
This list is accurate as of 10/1/18  9:14 AM.  Always use your most recent med list.  
  
  
  
  
 alendronate 70 mg tablet Commonly known as:  FOSAMAX Take 70 mg by mouth every seven (7) days. CALCIUM 600 PO Take 600 mg by mouth daily. ketorolac 0.5 % ophthalmic solution Commonly known as:  Colie Centers INTILL 1 DROP 4 TIMES A DAY INTO EYE HAVING SURGERY, START 2 DAYS PRIOR TO SURGERY  
  
 multivitamin tablet Commonly known as:  ONE A DAY Take 1 Tab by mouth daily. ofloxacin 0.3 % ophthalmic solution Commonly known as:  FLOXIN  
INSTILL 1 DROP 4 TIMES EVERY DAY INTO SURGERY EYE, START 2 DAYS PRIOR TO SURGERY  
  
 prednisoLONE acetate 1 % ophthalmic suspension Commonly known as:  PRED FORTE INSTILL 1 DROP 4 TIMES EVERY DAY INTO SURGERY EYE, TAPER AS DIRECTED  
  
 synthroid 25 mcg tablet Generic drug:  levothyroxine Take 25 mcg by mouth Daily (before breakfast). Follow-up Instructions Return for complete physical with Radha Samuel. \[facility] [place] [facility]\"" & HEALTH SERVICES! Dear Darrick Branch: 
Thank you for requesting a [employer] account. Our records indicate that you already have an active [employer] account. You can access your account anytime at [email or web address] Did you know that you can access your hospital and ER discharge instructions at any time in [employer]? You can also review all of your test results from your hospital stay or ER visit. Additional Information If you have questions, please visit the Frequently Asked Questions section of the [employer] website at [email or web address] Remember, [employer] is NOT to be used for urgent needs. For medical emergencies, dial 911. Now available from your iPhone and Android! Please provide this summary of care documentation to your next provider. Your primary care clinician is listed as Arabella Black. If you have any questions after today's visit, please call [phone].

## 2018-10-01 NOTE — LETTER
10/1/2018 8:47 AM 
 
 
Ms. Alicia Fuentes [address] [facility] [id number] [facility] - To Whom It May Concern: Ms Bianka Lopez is under our care. She has Celiac Disease which means she has special dietary  
 
needs, and will provide her own food. Sincerely, Godwin Noyola NP

## 2018-10-01 NOTE — PROGRESS NOTES
Patient's identity verified with two patient identifiers (name and date of birth). Reviewed record in preparation for visit and have obtained necessary documentation. 1. Have you been to the ER, urgent care clinic since your last visit? Hospitalized since your last visit? No  2. Have you seen or consulted any other health care providers outside of the [address] [place] [address] since your last visit? Include any pap smears or colon screening. No    Chief Complaint   Patient presents with    Pre-op Exam     Right Cataract surgery, scheduled for 10/24/18 with Dr. Claire Jernigan. Not fasting. Health Maintenance Due   Topic Date Due    Hepatitis C Screening  1960    Shingrix Vaccine Age 50> (1 of 2)  Patient reports has not had. 09/10/2009    FOBT Q 1 YEAR AGE 50-75  09/10/2009    Influenza Age 5 to Adult   Patient reports has not had. Declines.  08/01/2018       Wt Readings from Last 3 Encounters:   10/01/18 116 lb (52.6 kg)   01/25/18 117 lb 12.8 oz (53.4 kg)   01/08/18 119 lb 3.2 oz (54.1 kg)     Temp Readings from Last 3 Encounters:   10/01/18 98.2 °F (36.8 °C) (Oral)   01/08/18 98.7 °F (37.1 °C) (Oral)   12/01/17 98.8 °F (37.1 °C) (Oral)     BP Readings from Last 3 Encounters:   10/01/18 118/78   01/25/18 110/64   01/08/18 120/70     Pulse Readings from Last 3 Encounters:   10/01/18 84   01/08/18 80   12/01/17 83

## 2019-01-03 ENCOUNTER — HOSPITAL ENCOUNTER (OUTPATIENT)
Dept: MAMMOGRAPHY | Age: 59
Discharge: HOME | End: 2019-01-03
Attending: NURSE PRACTITIONER
Payer: COMMERCIAL

## 2019-01-03 DIAGNOSIS — Z12.31 VISIT FOR SCREENING MAMMOGRAM: ICD-10-CM

## 2019-01-03 PROCEDURE — 77067 SCR MAMMO BI INCL CAD: CPT

## 2019-02-18 ENCOUNTER — OFFICE VISIT (OUTPATIENT)
Dept: INTERNAL MEDICINE CLINIC | Age: 59
End: 2019-02-18

## 2019-02-18 VITALS
OXYGEN SATURATION: 98 % | RESPIRATION RATE: 16 BRPM | HEART RATE: 95 BPM | SYSTOLIC BLOOD PRESSURE: 124 MMHG | HEIGHT: 60 IN | TEMPERATURE: 98.7 F | BODY MASS INDEX: 22.65 KG/M2 | DIASTOLIC BLOOD PRESSURE: 78 MMHG

## 2019-02-18 DIAGNOSIS — Z98.890 H/O COLONOSCOPY: ICD-10-CM

## 2019-02-18 DIAGNOSIS — E06.3 HASHIMOTO'S DISEASE: ICD-10-CM

## 2019-02-18 DIAGNOSIS — M81.0 OSTEOPOROSIS, UNSPECIFIED OSTEOPOROSIS TYPE, UNSPECIFIED PATHOLOGICAL FRACTURE PRESENCE: ICD-10-CM

## 2019-02-18 DIAGNOSIS — Z98.890 HISTORY OF COLONOSCOPY WITH POLYPECTOMY: ICD-10-CM

## 2019-02-18 DIAGNOSIS — K90.0 CELIAC DISEASE: ICD-10-CM

## 2019-02-18 DIAGNOSIS — Z00.00 ROUTINE PHYSICAL EXAMINATION: Primary | ICD-10-CM

## 2019-02-18 DIAGNOSIS — R09.89 BRUIT OF LEFT CAROTID ARTERY: ICD-10-CM

## 2019-02-18 DIAGNOSIS — Z86.010 HISTORY OF COLONOSCOPY WITH POLYPECTOMY: ICD-10-CM

## 2019-02-18 NOTE — PATIENT INSTRUCTIONS
Schedule your carotid dopplers(ultrasounds).   [phone]    Call and schedule an appointment with Dr. Shilpa Davenport

## 2019-02-18 NOTE — PROGRESS NOTES
Subjective:      Omid Dunbar is a 60 y.o. female who presents today for CPE    Recently moved here for insurance- works in [employer] for [employer] son in [place], [place], is a freshman     Hashimotos: Follows with Dr. Phi Vee  Reports adherence to levothyroxine 25mcg daily  Feels is very sensitized to everything with the hashimoto's     Celiac Disease:  Avoids gluten  Pretty well controlled    Occasionally will feel foggy \"fog brained\"  Occurs intermittently  Will get anxious  Eats consistently, eating breakfast and lunch, early dinner  Sometimes eating during feeling fog brained helps, not all of the time     Hist Anxiety:  No meds, doing well  Wakes up frequently throughout the night, easily goes back to sleep  Not waking up to urinate    Osteoporosis:  Taking fosamax weekly  Had DEXA at [address], stable    Pulsatile Tinnitus:  Left ear, intermittent  Not bothersome to her  No increase in frequency or intensity    Denies any chest pain, shortness of breath, cough, abdominal pain, bowel changes, blood in stool, difficulty urinating, headaches, or dizziness  Occasional palpitations \"when I get nervous\"    Exercise: exercises regularly. Light weights, alternates upper and lower body exercise    Diet: eats real whole foods, salads for lunch      Health maintenance:   Last pap:  01/2018, Dr. Natividad Parada  Last mammogram:  01/2019   Last colonoscopy: 9 years ago?   Last tetanus vaccination  UTD  Last influenza vaccination  refuses  Shingles vaccination:  refuses    Patient Active Problem List    Diagnosis Date Noted    Bruit of left carotid artery 10/01/2018    H/O colonoscopy 01/29/2018    Mitral regurgitation 01/29/2018    Vitamin D insufficiency 01/29/2018    Hashimoto's disease 12/01/2017    Celiac disease 12/01/2017    Generalized anxiety disorder 12/01/2017    Osteoporosis 12/01/2017     Current Outpatient Medications   Medication Sig Dispense Refill    alendronate (FOSAMAX) 70 mg tablet Take 70 mg by mouth every seven (7) days.  calcium carbonate (CALCIUM 600 PO) Take 600 mg by mouth daily.  mv-min/iron/folic/calcium/vitK (WOMEN'S MULTIVITAMIN PO) Take  by mouth.  SYNTHROID 25 mcg tablet Take 25 mcg by mouth Daily (before breakfast). Review of Systems    Pertinent items are noted in HPI. Objective:     Visit Vitals  /78 (BP 1 Location: Left arm, BP Patient Position: Sitting)   Pulse 95   Temp 98.7 °F (37.1 °C) (Oral)   Resp 16   Ht 5' (1.524 m)   SpO2 98%   BMI 22.65 kg/m²     General:  Alert, cooperative, no distress, appears stated age. Head:  Normocephalic, without obvious abnormality, atraumatic. Eyes:  Conjunctivae/corneas clear. PERRL, EOMs intact   Ears:  Normal TMs and external ear canals both ears. Nose: Nares normal. Septum midline. Mucosa normal   Throat: Lips, mucosa, and tongue normal. Teeth and gums normal.   Neck: Supple, symmetrical, trachea midline, no adenopathy, thyroid: no enlargement/tenderness/nodules, no JVD, + LEFT carotid bruit, no right carotid bruit   Back:   Symmetric, no curvature. ROM normal   Lungs:   Clear to auscultation bilaterally. Chest wall:  No tenderness or deformity. Heart:  Regular rate and rhythm, S1, S2 normal, no murmur, click, rub or gallop. Abdomen:   Soft, non-tender. Bowel sounds normal. No masses,  No organomegaly. Extremities: Extremities normal, atraumatic, no cyanosis or edema. Pulses: 2+ and symmetric all extremities. Skin: Skin color, texture, turgor normal. No rashes or lesions. Lymph nodes: Cervical, supraclavicular nodes normal.   Neurologic: CNII-XII intact. Normal strength, sensation throughout. Assessment/Plan:     1. Routine physical examination  - refuses influenza and shingrix vaccinations  - believes had all labs from Endocrinology, will obtain records from Dr. Irwin Barroso  - AMB POC EKG ROUTINE W/ 12 LEADS, INTER & REP    2.  History of colonoscopy with polypectomy  - referral to GI  - REFERRAL TO GASTROENTEROLOGY    3. Celiac disease  - referral of GI  - REFERRAL TO GASTROENTEROLOGY    4. H/O colonoscopy  - as above    5. Hashimoto's disease  - cont following with Dr. Maged Azul    6. Osteoporosis, unspecified osteoporosis type, unspecified pathological fracture presence  - cont fosamax  - cont weight bearing exercises    7. Bruit of left carotid artery  - reviewed with patient, carotid doppler for further evaluation  - DUPLEX CAROTID BILATERAL; Future    Advised her to call back or return to office if symptoms worsen/change/persist.  Discussed expected course/resolution/complications of diagnosis in detail with patient. Medication risks/benefits/costs/interactions/alternatives discussed with patient. She was given an after visit summary which includes diagnoses, current medications, & vitals. She expressed understanding with the diagnosis and plan.     SUZANNE Coreas

## 2019-02-18 NOTE — PROGRESS NOTES
aSul Vinson is a 60 y.o. female    Chief Complaint   Patient presents with    Complete Physical       1. Have you been to the ER, urgent care clinic since your last visit? Hospitalized since your last visit? No    2. Have you seen or consulted any other health care providers outside of the [address] [place] [address] since your last visit? Include any pap smears or colon screening. No     There were no vitals taken for this visit.     Health Maintenance Due   Topic Date Due    Hepatitis C Screening  1960    Shingrix Vaccine Age 50> (1 of 2) 09/10/2009    FOBT Q 1 YEAR AGE 50-75  09/10/2009     Ximena Mooney LPN

## 2019-03-11 ENCOUNTER — HOSPITAL ENCOUNTER (OUTPATIENT)
Dept: ULTRASOUND IMAGING | Age: 59
Discharge: HOME | End: 2019-03-11
Attending: NURSE PRACTITIONER
Payer: COMMERCIAL

## 2019-03-11 DIAGNOSIS — R09.89 BRUIT OF LEFT CAROTID ARTERY: ICD-10-CM

## 2019-03-11 PROCEDURE — 93880 EXTRACRANIAL BILAT STUDY: CPT

## 2019-03-12 LAB
LEFT CCA DIST SYS: 80.8 CM/S
LEFT CCA MID SYS: 92 CM/S
LEFT CCA PROX SYS: 87.2 CM/S
LEFT ECA SYS: 106.3 CM/S
LEFT ICA DIST SYS: 70.1 CM/S
LEFT ICA MID SYS: 86.4 CM/S
LEFT ICA PROX SYS: 97.5 CM/S
LEFT ICA/CCA SYS: 1.1
LEFT VERTEBRAL SYS: 44.6 CM/S
RIGHT CCA DIST SYS: 82.4 CM/S
RIGHT CCA MID SYS: 84.8 CM/S
RIGHT CCA PROX SYS: 106.3 CM/S
RIGHT ECA SYS: 84.8 CM/S
RIGHT ICA DIST SYS: 95.2 CM/S
RIGHT ICA MID SYS: 80.8 CM/S
RIGHT ICA PROX SYS: 56.9 CM/S
RIGHT ICA/CCA SYS: 0.9
RIGHT VERTEBRAL SYS: 51.4 CM/S

## 2019-03-13 NOTE — PROGRESS NOTES
03/13/19  Results sent to patient via [id number] [facility] [address] Nathalia, NP

## 2019-05-06 ENCOUNTER — ANESTHESIA EVENT (OUTPATIENT)
Dept: ENDOSCOPY | Age: 59
End: 2019-05-06
Payer: COMMERCIAL

## 2019-05-06 ENCOUNTER — ANESTHESIA (OUTPATIENT)
Dept: ENDOSCOPY | Age: 59
End: 2019-05-06
Payer: COMMERCIAL

## 2019-05-06 ENCOUNTER — HOSPITAL ENCOUNTER (OUTPATIENT)
Age: 59
Setting detail: OUTPATIENT SURGERY
Discharge: HOME | End: 2019-05-06
Attending: INTERNAL MEDICINE | Admitting: INTERNAL MEDICINE
Payer: COMMERCIAL

## 2019-05-06 VITALS
TEMPERATURE: 97.6 F | HEIGHT: 60 IN | RESPIRATION RATE: 18 BRPM | SYSTOLIC BLOOD PRESSURE: 117 MMHG | BODY MASS INDEX: 22.64 KG/M2 | HEART RATE: 63 BPM | WEIGHT: 115.31 LBS | DIASTOLIC BLOOD PRESSURE: 70 MMHG | OXYGEN SATURATION: 100 %

## 2019-05-06 PROCEDURE — 77030020268 HC MISC GENERAL SUPPLY: Performed by: INTERNAL MEDICINE

## 2019-05-06 PROCEDURE — 77030013996 HC SNR POLYP ENDOSC OCOA -B: Performed by: INTERNAL MEDICINE

## 2019-05-06 PROCEDURE — 77030040362: Performed by: INTERNAL MEDICINE

## 2019-05-06 PROCEDURE — 76060000033 HC ANESTHESIA 1 TO 1.5 HR: Performed by: INTERNAL MEDICINE

## 2019-05-06 PROCEDURE — 77030010937 HC CLP LIG BSC -I: Performed by: INTERNAL MEDICINE

## 2019-05-06 PROCEDURE — 88305 TISSUE EXAM BY PATHOLOGIST: CPT

## 2019-05-06 PROCEDURE — 74011250636 HC RX REV CODE- 250/636

## 2019-05-06 PROCEDURE — 77030027957 HC TBNG IRR ENDOGTR BUSS -B: Performed by: INTERNAL MEDICINE

## 2019-05-06 PROCEDURE — 76040000008: Performed by: INTERNAL MEDICINE

## 2019-05-06 PROCEDURE — 77030013992 HC SNR POLYP ENDOSC BSC -B: Performed by: INTERNAL MEDICINE

## 2019-05-06 RX ORDER — FLUMAZENIL 0.1 MG/ML
0.2 INJECTION INTRAVENOUS
Status: DISCONTINUED | OUTPATIENT
Start: 2019-05-06 | End: 2019-05-06 | Stop reason: HOSPADM

## 2019-05-06 RX ORDER — MIDAZOLAM HYDROCHLORIDE 1 MG/ML
.25-1 INJECTION, SOLUTION INTRAMUSCULAR; INTRAVENOUS
Status: DISCONTINUED | OUTPATIENT
Start: 2019-05-06 | End: 2019-05-06 | Stop reason: HOSPADM

## 2019-05-06 RX ORDER — ATROPINE SULFATE 0.1 MG/ML
0.5 INJECTION INTRAVENOUS
Status: DISCONTINUED | OUTPATIENT
Start: 2019-05-06 | End: 2019-05-06 | Stop reason: HOSPADM

## 2019-05-06 RX ORDER — SODIUM CHLORIDE 9 MG/ML
INJECTION, SOLUTION INTRAVENOUS
Status: DISCONTINUED | OUTPATIENT
Start: 2019-05-06 | End: 2019-05-06 | Stop reason: HOSPADM

## 2019-05-06 RX ORDER — SODIUM CHLORIDE 9 MG/ML
50 INJECTION, SOLUTION INTRAVENOUS CONTINUOUS
Status: DISCONTINUED | OUTPATIENT
Start: 2019-05-06 | End: 2019-05-06 | Stop reason: HOSPADM

## 2019-05-06 RX ORDER — FENTANYL CITRATE 50 UG/ML
100 INJECTION, SOLUTION INTRAMUSCULAR; INTRAVENOUS
Status: DISCONTINUED | OUTPATIENT
Start: 2019-05-06 | End: 2019-05-06 | Stop reason: HOSPADM

## 2019-05-06 RX ORDER — EPINEPHRINE 0.1 MG/ML
1 INJECTION INTRACARDIAC; INTRAVENOUS
Status: DISCONTINUED | OUTPATIENT
Start: 2019-05-06 | End: 2019-05-06 | Stop reason: HOSPADM

## 2019-05-06 RX ORDER — LIDOCAINE HYDROCHLORIDE 20 MG/ML
INJECTION, SOLUTION INFILTRATION; PERINEURAL AS NEEDED
Status: DISCONTINUED | OUTPATIENT
Start: 2019-05-06 | End: 2019-05-06 | Stop reason: HOSPADM

## 2019-05-06 RX ORDER — SODIUM CHLORIDE 0.9 % (FLUSH) 0.9 %
5-40 SYRINGE (ML) INJECTION EVERY 8 HOURS
Status: DISCONTINUED | OUTPATIENT
Start: 2019-05-06 | End: 2019-05-06 | Stop reason: HOSPADM

## 2019-05-06 RX ORDER — NALOXONE HYDROCHLORIDE 0.4 MG/ML
0.4 INJECTION, SOLUTION INTRAMUSCULAR; INTRAVENOUS; SUBCUTANEOUS
Status: DISCONTINUED | OUTPATIENT
Start: 2019-05-06 | End: 2019-05-06 | Stop reason: HOSPADM

## 2019-05-06 RX ORDER — PROPOFOL 10 MG/ML
INJECTION, EMULSION INTRAVENOUS AS NEEDED
Status: DISCONTINUED | OUTPATIENT
Start: 2019-05-06 | End: 2019-05-06 | Stop reason: HOSPADM

## 2019-05-06 RX ORDER — PHENYLEPHRINE HCL IN 0.9% NACL 0.4MG/10ML
SYRINGE (ML) INTRAVENOUS AS NEEDED
Status: DISCONTINUED | OUTPATIENT
Start: 2019-05-06 | End: 2019-05-06 | Stop reason: HOSPADM

## 2019-05-06 RX ORDER — SODIUM CHLORIDE 0.9 % (FLUSH) 0.9 %
5-40 SYRINGE (ML) INJECTION AS NEEDED
Status: DISCONTINUED | OUTPATIENT
Start: 2019-05-06 | End: 2019-05-06 | Stop reason: HOSPADM

## 2019-05-06 RX ORDER — DEXTROMETHORPHAN/PSEUDOEPHED 2.5-7.5/.8
1.2 DROPS ORAL
Status: DISCONTINUED | OUTPATIENT
Start: 2019-05-06 | End: 2019-05-06 | Stop reason: HOSPADM

## 2019-05-06 RX ADMIN — PROPOFOL 20 MG: 10 INJECTION, EMULSION INTRAVENOUS at 11:09

## 2019-05-06 RX ADMIN — PROPOFOL 20 MG: 10 INJECTION, EMULSION INTRAVENOUS at 11:05

## 2019-05-06 RX ADMIN — PROPOFOL 20 MG: 10 INJECTION, EMULSION INTRAVENOUS at 10:41

## 2019-05-06 RX ADMIN — PROPOFOL 20 MG: 10 INJECTION, EMULSION INTRAVENOUS at 11:12

## 2019-05-06 RX ADMIN — PROPOFOL 20 MG: 10 INJECTION, EMULSION INTRAVENOUS at 10:45

## 2019-05-06 RX ADMIN — PROPOFOL 20 MG: 10 INJECTION, EMULSION INTRAVENOUS at 10:15

## 2019-05-06 RX ADMIN — SODIUM CHLORIDE: 9 INJECTION, SOLUTION INTRAVENOUS at 11:05

## 2019-05-06 RX ADMIN — PROPOFOL 20 MG: 10 INJECTION, EMULSION INTRAVENOUS at 10:19

## 2019-05-06 RX ADMIN — PROPOFOL 20 MG: 10 INJECTION, EMULSION INTRAVENOUS at 10:38

## 2019-05-06 RX ADMIN — PROPOFOL 20 MG: 10 INJECTION, EMULSION INTRAVENOUS at 11:22

## 2019-05-06 RX ADMIN — PROPOFOL 20 MG: 10 INJECTION, EMULSION INTRAVENOUS at 10:32

## 2019-05-06 RX ADMIN — Medication 80 MCG: at 10:19

## 2019-05-06 RX ADMIN — SODIUM CHLORIDE: 9 INJECTION, SOLUTION INTRAVENOUS at 10:00

## 2019-05-06 RX ADMIN — PROPOFOL 20 MG: 10 INJECTION, EMULSION INTRAVENOUS at 10:53

## 2019-05-06 RX ADMIN — PROPOFOL 20 MG: 10 INJECTION, EMULSION INTRAVENOUS at 10:35

## 2019-05-06 RX ADMIN — PROPOFOL 20 MG: 10 INJECTION, EMULSION INTRAVENOUS at 11:01

## 2019-05-06 RX ADMIN — PROPOFOL 20 MG: 10 INJECTION, EMULSION INTRAVENOUS at 11:19

## 2019-05-06 RX ADMIN — PROPOFOL 20 MG: 10 INJECTION, EMULSION INTRAVENOUS at 11:15

## 2019-05-06 RX ADMIN — PROPOFOL 20 MG: 10 INJECTION, EMULSION INTRAVENOUS at 10:24

## 2019-05-06 RX ADMIN — PROPOFOL 50 MG: 10 INJECTION, EMULSION INTRAVENOUS at 10:14

## 2019-05-06 RX ADMIN — PROPOFOL 20 MG: 10 INJECTION, EMULSION INTRAVENOUS at 10:16

## 2019-05-06 RX ADMIN — PROPOFOL 20 MG: 10 INJECTION, EMULSION INTRAVENOUS at 10:26

## 2019-05-06 RX ADMIN — PROPOFOL 20 MG: 10 INJECTION, EMULSION INTRAVENOUS at 10:49

## 2019-05-06 RX ADMIN — Medication 40 MCG: at 10:44

## 2019-05-06 RX ADMIN — LIDOCAINE HYDROCHLORIDE 60 MG: 20 INJECTION, SOLUTION INFILTRATION; PERINEURAL at 10:13

## 2019-05-06 RX ADMIN — PROPOFOL 20 MG: 10 INJECTION, EMULSION INTRAVENOUS at 10:57

## 2019-05-06 RX ADMIN — Medication 40 MCG: at 10:50

## 2019-05-06 RX ADMIN — PROPOFOL 20 MG: 10 INJECTION, EMULSION INTRAVENOUS at 10:22

## 2019-05-06 RX ADMIN — PROPOFOL 20 MG: 10 INJECTION, EMULSION INTRAVENOUS at 10:29

## 2019-05-06 NOTE — H&P
[address] [facility] 
[address] [facility] [address] [place], [address] 
[phone] History and Physical  
 
NAME: Sergio Kearney :  1960 MRN:  [id number] HPI:  The patient was seen and examined. Past Surgical History:  
Procedure Laterality Date  HX CERVICAL POLYPECTOMY  2018  
 removed - Dr. Dolan Soulier  HX GYN  2001  
 tubal ligation  HX OTHER SURGICAL  2017 Left Inguinal Node Biopsy Past Medical History:  
Diagnosis Date  Celiac disease  SY (generalized anxiety disorder)  Hashimoto's disease  Mitral regurgitation  Osteoporosis Social History Tobacco Use  Smoking status: Former Smoker Years: 9.00 Last attempt to quit:  Years since quittin.3  Smokeless tobacco: Never Used Substance Use Topics  Alcohol use: No  
 Drug use: No  
 
Allergies Allergen Reactions  Penicillins Hives  Wheat Unknown (comments) Barely, rye (Celiac) Family History Problem Relation Age of Onset  No Known Problems Mother  Cancer Father   
     gleoblastoma- brain  Diabetes Maternal Grandmother  Cancer Maternal Grandfather   
     liver  No Known Problems Paternal Grandmother  No Known Problems Paternal Grandfather No current facility-administered medications for this encounter. PHYSICAL EXAM: 
General: WD, WN. Alert, cooperative, no acute distress   
HEENT: NC, Atraumatic. PERRLA, EOMI. Anicteric sclerae. Lungs:  CTA Bilaterally. No Wheezing/Rhonchi/Rales. Heart:  Regular  rhythm,  No murmur, No Rubs, No Gallops Abdomen: Soft, Non distended, Non tender.  +Bowel sounds, no HSM Extremities: No c/c/e Neurologic:  CN 2-12 gi, Alert and oriented X 3. No acute neurological distress Psych:   Good insight. Not anxious nor agitated. The heart, lungs and mental status were satisfactory for the administration of MAC sedation and for the procedure. Mallampati score: 2 Assessment:  
· Polypectomy of right sided colon polyp Plan:  
· Endoscopic procedure : colonoscopy · MAC sedation

## 2019-05-06 NOTE — ANESTHESIA PREPROCEDURE EVALUATION
Relevant Problems No relevant active problems Anesthetic History No history of anesthetic complications Review of Systems / Medical History Nursing notes reviewed and pertinent labs reviewed Pulmonary Within defined limits Neuro/Psych Within defined limits Cardiovascular Exercise tolerance: >4 METS 
  
GI/Hepatic/Renal 
  
 
 
 
 
 
 Endo/Other Hypothyroidism Other Findings Physical Exam 
 
Airway Mallampati: I 
TM Distance: > 6 cm Neck ROM: normal range of motion Mouth opening: Normal 
 
 Cardiovascular Rhythm: regular Rate: normal 
 
 
 
 Dental 
No notable dental hx Pulmonary Breath sounds clear to auscultation Abdominal 
 
 
 
 Other Findings Anesthetic Plan ASA: 2 Anesthesia type: MAC Induction: Intravenous Anesthetic plan and risks discussed with: Patient

## 2019-05-06 NOTE — PROCEDURES
Jade Merariamy 272  217 [facility] [address], [address]  [phone]                              Colonoscopy Procedure Note      Indications: For polypectomy of known ascending colon sessile serrated adenoma     :  July Ibrahim MD    Referring Provider: Allyson Gonsalez NP    Sedation:  MAC anesthesia    Procedure Details:  After informed consent was obtained with all risks and benefits of procedure explained and preoperative exam completed, the patient was taken to the endoscopy suite and placed in the left lateral decubitus position. Upon sequential sedation as per above, a digital rectal exam was performed per below. The Olympus videocolonoscope was inserted in the rectum and carefully advanced to the cecum, which was identified by the ileocecal valve and appendiceal orifice. The quality of preparation was good. [place] Bowel Prep Score :3/3/3. The colonoscope was slowly withdrawn with careful evaluation between folds. Retroflexion in the rectum was performed. Findings:   Rectum: small internal hemorrhoids  Sigmoid: normal  Descending Colon: normal  Transverse Colon: normal  Ascending Colon: 30-35 mm flat polyp on back of fold. Injected with 18 cc of orise for lift (injection performed in retroflexion). Removed polyp piecemeal in complete fashion. Closed with 5 clips to prevent future bleeding  Cecum: normal  Terminal Ileum:     Interventions:  EMR    Specimen Removed:    ID Type Source Tests Collected by Time Destination   1 : ascending colon polyp ( hot snare and biospy) Preservative Colon, Ascending  Gustavo Mirza MD 5/6/2019 1113 Pathology       Complications: None. EBL:  10 cc    Impression:    See Postoperative diagnosis above    Recommendations:   - Await pathology. You should receive a letter within 2 weeks. - Resume normal medications.  - Recommend repeat colonoscopy in 6 months noting piecemeal resection.      Discharge Disposition:  Home in the company of a  when able to ambulate.     Sadia Guzman MD  5/6/2019  11:31 AM

## 2019-05-06 NOTE — ROUTINE PROCESS
Steffen Goodwin 1960 
[id number] Situation: 
Verbal report received from: MG Wong Procedure: Procedure(s): 
COLONOSCOPY WTIH EMR INJECTION 
POLYPECTOMY RESOLUTION CLIP Background: 
 
Preoperative diagnosis: BENIGN NEOPLASM OF ASCENDING COLON Postoperative diagnosis: colon polyp :  Dr. Jaquez Police Assistant(s): Endoscopy RN-1: Tadeo Farnsworth Endoscopy RN-2: Gina Michel RN Specimens:  
ID Type Source Tests Collected by Time Destination 1 : ascending colon polyp ( hot snare and biospy) Preservative Colon, Ascending  Pipo Gandhi MD 5/6/2019 1113 Pathology H. Pylori  no Assessment: 
Intra-procedure medications Anesthesia gave intra-procedure sedation and medications, see anesthesia flow sheet yes Intravenous fluids: NS@ Ander File Vital signs stable Abdominal assessment: round and soft Recommendation: 
Discharge patient per MD order. Family or Friend Permission to share finding with family or friend yes

## 2019-05-06 NOTE — PROGRESS NOTES
Endoscope was pre-cleaned at the bedside immediately following procedure by tiffanie collado rn 
 
Received report from Anesthesia-see anesthesia record for vital signs and medications administered during procedure. Resumed care for vital signs and medications. Brielle Arzate

## 2019-05-07 NOTE — ANESTHESIA POSTPROCEDURE EVALUATION
Post-Anesthesia Evaluation and Assessment Patient: Umu Rivas MRN: [id number]  SSN: [id number] YOB: 1960  Age: 60 y.o. Sex: female I have evaluated the patient and they are stable and ready for discharge from the PACU. Cardiovascular Function/Vital Signs Visit Vitals /70 Pulse 63 Temp 36.4 °C (97.6 °F) Resp 18 Ht 5' (1.524 m) Wt 52.3 kg (115 lb 5 oz) SpO2 100% Breastfeeding? No  
BMI 22.52 kg/m² Patient is status post MAC anesthesia for Procedure(s): 
COLONOSCOPY WTIH EMR INJECTION 
POLYPECTOMY RESOLUTION CLIP. Nausea/Vomiting: None Postoperative hydration reviewed and adequate. Pain: 
Pain Scale 1: Numeric (0 - 10) (05/06/19 1156) Pain Intensity 1: 0 (05/06/19 1156) Managed Neurological Status: At baseline Mental Status, Level of Consciousness: Alert and  oriented to person, place, and time Pulmonary Status:  
O2 Device: Room air (05/06/19 1156) Adequate oxygenation and airway patent Complications related to anesthesia: None Post-anesthesia assessment completed. No concerns Signed By: Althea Early MD   
 May 7, 2019 Procedure(s): 
COLONOSCOPY WTIH EMR INJECTION 
POLYPECTOMY RESOLUTION CLIP. MAC 
 
<BSHSIANPOST> Vitals Value Taken Time  
BP 0/0 5/6/2019 12:16 PM  
Temp 36.4 °C (97.6 °F) 5/6/2019 11:45 AM  
Pulse 0 5/6/2019 12:16 PM  
Resp 0 5/6/2019 12:15 PM  
SpO2 100 % 5/6/2019 11:56 AM  
Vitals shown include unvalidated device data.

## 2019-11-04 ENCOUNTER — ANESTHESIA (OUTPATIENT)
Dept: ENDOSCOPY | Age: 59
End: 2019-11-04
Payer: COMMERCIAL

## 2019-11-04 ENCOUNTER — ANESTHESIA EVENT (OUTPATIENT)
Dept: ENDOSCOPY | Age: 59
End: 2019-11-04
Payer: COMMERCIAL

## 2019-11-04 ENCOUNTER — HOSPITAL ENCOUNTER (OUTPATIENT)
Age: 59
Setting detail: OUTPATIENT SURGERY
Discharge: HOME | End: 2019-11-04
Attending: INTERNAL MEDICINE | Admitting: INTERNAL MEDICINE
Payer: COMMERCIAL

## 2019-11-04 VITALS
SYSTOLIC BLOOD PRESSURE: 107 MMHG | DIASTOLIC BLOOD PRESSURE: 86 MMHG | BODY MASS INDEX: 22.58 KG/M2 | HEIGHT: 60 IN | HEART RATE: 65 BPM | WEIGHT: 115 LBS | OXYGEN SATURATION: 100 % | TEMPERATURE: 97.4 F | RESPIRATION RATE: 16 BRPM

## 2019-11-04 PROCEDURE — 74011250636 HC RX REV CODE- 250/636: Performed by: NURSE ANESTHETIST, CERTIFIED REGISTERED

## 2019-11-04 PROCEDURE — 88305 TISSUE EXAM BY PATHOLOGIST: CPT

## 2019-11-04 PROCEDURE — 74011250637 HC RX REV CODE- 250/637: Performed by: INTERNAL MEDICINE

## 2019-11-04 PROCEDURE — 76040000007: Performed by: INTERNAL MEDICINE

## 2019-11-04 PROCEDURE — 77030021593 HC FCPS BIOP ENDOSC BSC -A: Performed by: INTERNAL MEDICINE

## 2019-11-04 PROCEDURE — 76060000032 HC ANESTHESIA 0.5 TO 1 HR: Performed by: INTERNAL MEDICINE

## 2019-11-04 PROCEDURE — 74011000250 HC RX REV CODE- 250: Performed by: NURSE ANESTHETIST, CERTIFIED REGISTERED

## 2019-11-04 RX ORDER — PROPOFOL 10 MG/ML
INJECTION, EMULSION INTRAVENOUS AS NEEDED
Status: DISCONTINUED | OUTPATIENT
Start: 2019-11-04 | End: 2019-11-04 | Stop reason: HOSPADM

## 2019-11-04 RX ORDER — SODIUM CHLORIDE 9 MG/ML
INJECTION, SOLUTION INTRAVENOUS
Status: DISCONTINUED | OUTPATIENT
Start: 2019-11-04 | End: 2019-11-04 | Stop reason: HOSPADM

## 2019-11-04 RX ORDER — GUAIFENESIN 100 MG/5ML
81 LIQUID (ML) ORAL DAILY
COMMUNITY

## 2019-11-04 RX ORDER — SODIUM CHLORIDE 0.9 % (FLUSH) 0.9 %
5-40 SYRINGE (ML) INJECTION EVERY 8 HOURS
Status: DISCONTINUED | OUTPATIENT
Start: 2019-11-04 | End: 2019-11-04 | Stop reason: HOSPADM

## 2019-11-04 RX ORDER — DEXTROMETHORPHAN/PSEUDOEPHED 2.5-7.5/.8
1.2 DROPS ORAL
Status: DISCONTINUED | OUTPATIENT
Start: 2019-11-04 | End: 2019-11-04 | Stop reason: HOSPADM

## 2019-11-04 RX ORDER — ATROPINE SULFATE 0.1 MG/ML
0.5 INJECTION INTRAVENOUS
Status: DISCONTINUED | OUTPATIENT
Start: 2019-11-04 | End: 2019-11-04 | Stop reason: HOSPADM

## 2019-11-04 RX ORDER — NALOXONE HYDROCHLORIDE 0.4 MG/ML
0.4 INJECTION, SOLUTION INTRAMUSCULAR; INTRAVENOUS; SUBCUTANEOUS
Status: DISCONTINUED | OUTPATIENT
Start: 2019-11-04 | End: 2019-11-04 | Stop reason: HOSPADM

## 2019-11-04 RX ORDER — SODIUM CHLORIDE 0.9 % (FLUSH) 0.9 %
5-40 SYRINGE (ML) INJECTION AS NEEDED
Status: DISCONTINUED | OUTPATIENT
Start: 2019-11-04 | End: 2019-11-04 | Stop reason: HOSPADM

## 2019-11-04 RX ORDER — MIDAZOLAM HYDROCHLORIDE 1 MG/ML
.25-1 INJECTION, SOLUTION INTRAMUSCULAR; INTRAVENOUS
Status: DISCONTINUED | OUTPATIENT
Start: 2019-11-04 | End: 2019-11-04 | Stop reason: HOSPADM

## 2019-11-04 RX ORDER — EPINEPHRINE 0.1 MG/ML
1 INJECTION INTRACARDIAC; INTRAVENOUS
Status: DISCONTINUED | OUTPATIENT
Start: 2019-11-04 | End: 2019-11-04 | Stop reason: HOSPADM

## 2019-11-04 RX ORDER — FENTANYL CITRATE 50 UG/ML
100 INJECTION, SOLUTION INTRAMUSCULAR; INTRAVENOUS
Status: DISCONTINUED | OUTPATIENT
Start: 2019-11-04 | End: 2019-11-04 | Stop reason: HOSPADM

## 2019-11-04 RX ORDER — SODIUM CHLORIDE 9 MG/ML
50 INJECTION, SOLUTION INTRAVENOUS CONTINUOUS
Status: DISCONTINUED | OUTPATIENT
Start: 2019-11-04 | End: 2019-11-04 | Stop reason: HOSPADM

## 2019-11-04 RX ORDER — LIDOCAINE HYDROCHLORIDE 20 MG/ML
INJECTION, SOLUTION EPIDURAL; INFILTRATION; INTRACAUDAL; PERINEURAL AS NEEDED
Status: DISCONTINUED | OUTPATIENT
Start: 2019-11-04 | End: 2019-11-04 | Stop reason: HOSPADM

## 2019-11-04 RX ORDER — FLUMAZENIL 0.1 MG/ML
0.2 INJECTION INTRAVENOUS
Status: DISCONTINUED | OUTPATIENT
Start: 2019-11-04 | End: 2019-11-04 | Stop reason: HOSPADM

## 2019-11-04 RX ADMIN — PROPOFOL 50 MG: 10 INJECTION, EMULSION INTRAVENOUS at 08:34

## 2019-11-04 RX ADMIN — SODIUM CHLORIDE: 900 INJECTION, SOLUTION INTRAVENOUS at 08:20

## 2019-11-04 RX ADMIN — PROPOFOL 40 MG: 10 INJECTION, EMULSION INTRAVENOUS at 08:42

## 2019-11-04 RX ADMIN — PROPOFOL 60 MG: 10 INJECTION, EMULSION INTRAVENOUS at 08:49

## 2019-11-04 RX ADMIN — PROPOFOL 40 MG: 10 INJECTION, EMULSION INTRAVENOUS at 08:45

## 2019-11-04 RX ADMIN — PROPOFOL 100 MG: 10 INJECTION, EMULSION INTRAVENOUS at 08:27

## 2019-11-04 RX ADMIN — PROPOFOL 50 MG: 10 INJECTION, EMULSION INTRAVENOUS at 08:29

## 2019-11-04 RX ADMIN — LIDOCAINE HYDROCHLORIDE 40 MG: 20 INJECTION, SOLUTION EPIDURAL; INFILTRATION; INTRACAUDAL; PERINEURAL at 08:27

## 2019-11-04 RX ADMIN — PROPOFOL 40 MG: 10 INJECTION, EMULSION INTRAVENOUS at 08:37

## 2019-11-04 NOTE — H&P
[facility].  [address] [place] [address] [address], [address]  [phone]                                History and Physical     NAME: Stevie Jj   :  1960   MRN:  [id number]     HPI:  The patient was seen and examined.     Past Surgical History:   Procedure Laterality Date    COLONOSCOPY N/A 2019    COLONOSCOPY WTIH EMR performed by Braydon Thomas MD at [facility] ENDOSCOPY    HX CERVICAL POLYPECTOMY  2018    removed - Dr. Keron Riddle      tubal ligation    HX OTHER SURGICAL  2017    Left Inguinal Node Biopsy     Past Medical History:   Diagnosis Date    Celiac disease     SY (generalized anxiety disorder)     Hashimoto's disease     Mitral regurgitation     Osteoporosis      Social History     Tobacco Use    Smoking status: Former Smoker     Years: 9.00     Last attempt to quit: 2006     Years since quittin.8    Smokeless tobacco: Never Used   Substance Use Topics    Alcohol use: No    Drug use: No     Allergies   Allergen Reactions    Penicillins Hives    Wheat Unknown (comments)     Barely, rye (Celiac)     Family History   Problem Relation Age of Onset    No Known Problems Mother     Cancer Father         gleoblastoma- brain    Diabetes Maternal Grandmother     Cancer Maternal Grandfather         liver    No Known Problems Paternal Grandmother     No Known Problems Paternal Grandfather      Current Facility-Administered Medications   Medication Dose Route Frequency    0.9% sodium chloride infusion  50 mL/hr IntraVENous CONTINUOUS    sodium chloride (NS) flush 5-40 mL  5-40 mL IntraVENous Q8H    sodium chloride (NS) flush 5-40 mL  5-40 mL IntraVENous PRN    midazolam (VERSED) injection 0.25-10 mg  0.25-10 mg IntraVENous Multiple    fentaNYL citrate (PF) injection 100 mcg  100 mcg IntraVENous MULTIPLE DOSE GIVEN    naloxone (NARCAN) injection 0.4 mg  0.4 mg IntraVENous Multiple    flumazenil (ROMAZICON) 0.1 mg/mL injection 0.2 mg 0.2 mg IntraVENous Multiple    simethicone (MYLICON) 67FP/8.8VW oral drops 80 mg  1.2 mL Oral Multiple    atropine injection 0.5 mg  0.5 mg IntraVENous ONCE PRN    EPINEPHrine (ADRENALIN) 0.1 mg/mL syringe 1 mg  1 mg Endoscopically ONCE PRN         PHYSICAL EXAM:  General: WD, WN. Alert, cooperative, no acute distress    HEENT: NC, Atraumatic. PERRLA, EOMI. Anicteric sclerae. Lungs:  CTA Bilaterally. No Wheezing/Rhonchi/Rales. Heart:  Regular  rhythm,  No murmur, No Rubs, No Gallops  Abdomen: Soft, Non distended, Non tender.  +Bowel sounds, no HSM  Extremities: No c/c/e  Neurologic:  CN 2-12 gi, Alert and oriented X 3. No acute neurological distress   Psych:   Good insight. Not anxious nor agitated. The heart, lungs and mental status were satisfactory for the administration of MAC sedation and for the procedure.       Mallampati score: 2       Assessment:   · Follow up prior polypectomy    Plan:   · Endoscopic procedure :colonoscopy  · MAC sedation

## 2019-11-04 NOTE — PROCEDURES
118 [facility].  217 [facility] [address], [address]  [phone]                              Colonoscopy Procedure Note      Indications: Follow up polypectomy site (large SSA), last colonoscopy 6 months prior     :  Jessica Turner MD    Referring Provider: Andrew Pedersen NP    Sedation:  MAC anesthesia    Procedure Details:  After informed consent was obtained with all risks and benefits of procedure explained and preoperative exam completed, the patient was taken to the endoscopy suite and placed in the left lateral decubitus position. Upon sequential sedation as per above, a digital rectal exam was performed per below. The Olympus videocolonoscope was inserted in the rectum and carefully advanced to the cecum, which was identified by the ileocecal valve and appendiceal orifice. The quality of preparation was good. [place] Bowel Prep Score : 3/3/3. The colonoscope was slowly withdrawn with careful evaluation between folds. Retroflexion in the rectum was performed. Findings:   Rectum: normal  Sigmoid: normal  Descending Colon: normal  Transverse Colon: normal  Ascending Colon: polypectomy site around hepatic flexure without obvious residual polyp. One small area of polypoid tissue, possible scar tissue vs residual polyp (2 mm), removed with forceps. Nearby tattoo noted  Cecum: normal  Terminal Ileum: normal    Interventions: Forceps polypectomy    Specimen Removed:    ID Type Source Tests Collected by Time Destination   1 : ascending colon polypectomy Preservative Colon, Ascending  Isidra Galdamez MD 11/4/2019 0426 Pathology       Complications: None. EBL:  Minimal    Impression:    See Postoperative diagnosis above    Recommendations:   - Await pathology. You should receive a letter within 2 weeks. - Resume normal medications.  - Recommend repeat colonoscopy in either 1 or 3 yrs pending pathology.      Discharge Disposition:  Home in the company of a  when able to ambulate.     Xi Thomas MD  11/4/2019  9:01 AM

## 2019-11-04 NOTE — ANESTHESIA POSTPROCEDURE EVALUATION
Procedure(s):  COLONOSCOPY  ENDOSCOPIC POLYPECTOMY. MAC    Anesthesia Post Evaluation      Multimodal analgesia: multimodal analgesia not used between 6 hours prior to anesthesia start to PACU discharge  Patient location during evaluation: PACU  Patient participation: complete - patient participated  Level of consciousness: awake  Pain score: 0  Pain management: adequate  Airway patency: patent  Anesthetic complications: no  Cardiovascular status: acceptable  Respiratory status: acceptable  Hydration status: acceptable  Comments: I have evaluated the patient and meets criteria for discharge from PACU. Timothy Guzman MD        Vitals Value Taken Time   /86 11/4/2019  9:15 AM   Temp 36.3 °C (97.4 °F) 11/4/2019  8:57 AM   Pulse 69 11/4/2019  9:20 AM   Resp 82 11/4/2019  9:20 AM   SpO2 96 % 11/4/2019  9:20 AM   Vitals shown include unvalidated device data.

## 2019-11-04 NOTE — PERIOP NOTES

## 2019-11-04 NOTE — ANESTHESIA PREPROCEDURE EVALUATION
Relevant Problems   No relevant active problems       Anesthetic History   No history of anesthetic complications            Review of Systems / Medical History  Patient summary reviewed, nursing notes reviewed and pertinent labs reviewed    Pulmonary  Within defined limits                 Neuro/Psych         Psychiatric history     Cardiovascular  Within defined limits                     GI/Hepatic/Renal  Within defined limits              Endo/Other  Within defined limits    Hypothyroidism       Other Findings              Physical Exam    Airway  Mallampati: II  TM Distance: > 6 cm  Neck ROM: normal range of motion   Mouth opening: Normal     Cardiovascular  Regular rate and rhythm,  S1 and S2 normal,  no murmur, click, rub, or gallop             Dental  No notable dental hx       Pulmonary  Breath sounds clear to auscultation               Abdominal  GI exam deferred       Other Findings            Anesthetic Plan    ASA: 2  Anesthesia type: MAC            Anesthetic plan and risks discussed with: Patient

## 2019-11-04 NOTE — ROUTINE PROCESS
Keysha Elba 1960 
[id number] Situation: 
Verbal report received from: 
Procedure: Procedure(s): 
COLONOSCOPY 
ENDOSCOPIC POLYPECTOMY Background: 
 
Preoperative diagnosis: BENIGN NEOPLASM OF ASCENDING COLON Postoperative diagnosis: polypectomy :  Dr. Dustin Beatty): Endoscopy Technician-1: Michaela Patel Endoscopy RN-1: Domitila Mason RN Specimens:  
ID Type Source Tests Collected by Time Destination 1 : ascending colon polypectomy Preservative Colon, Ascending  Paulo Stringer MD 11/4/2019 1017 Pathology H. Pylori  no Assessment: 
Intra-procedure medications Anesthesia gave intra-procedure sedation and medications, see anesthesia flow sheet yes Intravenous fluids: NS@ Ardelle [place] Vital signs stable Abdominal assessment: round and soft Recommendation: 
Discharge patient per MD order Family or Friend Permission to share finding with family or friend yes

## 2019-11-04 NOTE — DISCHARGE INSTRUCTIONS
118 [facility].  [address] [place] [address] [address], [address]  [address]  [phone]  1960    It was my pleasure seeing you for your procedure. You will also receive a summary report with the findings from this procedure and any further recommendations. If you had polyps removed or biopsies taken during your procedure, you will receive a separate letter from me within the next 2 weeks. If you don't receive this letter or if you have any questions, please call my office [phone]. Please take note of the post procedure instructions listed below. Garry Fox,    Dr. Jessica Gutierrez    These instructions give you information on caring for yourself after your procedure. Call your doctor if you have any problems or questions after your procedure. HOME CARE  · Walk if you have belly cramping or gas. Walking will help get rid of the air and reduce the bloated feeling in your belly (abdomen). · Your IV site (where you received drugs) may be tender to touch. Place warm towels on the site; keep your arm up on two pillows if you have any swelling or soreness in the area. · You may shower. ACTIVITY:  · Take frequent rest periods and move at a slower pace for the next 24 hours. .  · You may resume your regular activity tomorrow if you are feeling back to normal.  · Do not drive or ride a bicycle for at least 24 hours (because of the medicine (anesthesia) used during the test). · Do not sign any important legal documents or use or operate any machinery for 24 hours  · Do not take sleeping medicines/nerve drugs for 24 hours unless the doctor tells you. · You can return to work/school tomorrow unless otherwise instructed. NUTRITION:  · Drink plenty of fluids to keep your pee (urine) clear or pale yellow  · Begin with a light meal and progress to your normal diet.  Heavy or fried foods are harder to digest and may make you feel sick to your stomach (nauseated). · Once you are feeling back to normal, you may resume your normal diet as instructed by your doctor. · Avoid alcoholic beverages for 24 hours or as instructed. IF YOU HAD BIOPSIES TAKEN OR POLYPS REMOVED DURING THE PROCEDURE:  · For the next 7 days, avoid all non-steroidal antiinflammatory medications such as Ibuprofen, Motrin, Advil, Alleve, Deepika-seltzer, Goody's powder, BC powder. · If you do not have an heart condition that requires you to take a daily aspirin, you should avoid taking aspirin for 7 days. · Eat a soft diet for 24 hours. · Monitor your stools for any blood or dark black, tar-like, stools as this may be a sign of bleeding and if you see any blood, notify your doctor immediately. GET HELP RIGHT AWAY AND SEEK IMMEDIATE MEDICAL CARE IF:  · You have more than a spotting of blood in your stool. · You pass clumps of tissue (blood clots) or fill the toilet with blood. · Your belly is painfully swollen or puffy (abdominal distention). · You throw up (vomit). · You have a fever. · You have redness, pain or swelling at the IV site that last greater than two days. · You have abdominal pain or discomfort that is severe or gets worse throughout the day. Post-procedure diagnosis:  polypectomy    Post-procedure recommendations:  Findings:   Rectum: normal  Sigmoid: normal  Descending Colon: normal  Transverse Colon: normal  Ascending Colon: polypectomy site around hepatic flexure without obvious residual polyp. One small area of polypoid tissue, possible scar tissue vs residual polyp (2 mm), removed with forceps. Nearby tattoo noted  Cecum: normal  Terminal Ileum: normal    Recommendations:   - Await pathology. You should receive a letter within 2 weeks. - Resume normal medications.  - Recommend repeat colonoscopy in either 1 or 3 yrs pending pathology.        Patient Education        Colon Polyps: Care Instructions  Your Care Instructions    Colon polyps are growths in the colon or the rectum. The cause of most colon polyps is not known, and most people who get them do not have any problems. But a certain kind can turn into cancer. For this reason, regular testing for colon polyps is important for people as they get older. It is also important for anyone who has an increased risk for colon cancer. Polyps are usually found through routine colon cancer screening tests. Although most colon polyps are not cancerous, they are usually removed and then tested for cancer. Screening for colon cancer saves lives because the cancer can usually be cured if it is caught early. If you have a polyp that is the type that can turn into cancer, you may need more tests to examine your entire colon. The doctor will remove any other polyps that he or she finds, and you will be tested more often. Follow-up care is a key part of your treatment and safety. Be sure to make and go to all appointments, and call your doctor if you are having problems. It's also a good idea to know your test results and keep a list of the medicines you take. How can you care for yourself at home? Regular exams to look for colon polyps are the best way to prevent polyps from turning into colon cancer. These can include stool tests, sigmoidoscopy, colonoscopy, and CT colonography. Talk with your doctor about a testing schedule that is right for you. To prevent polyps  There is no home treatment that can prevent colon polyps. But these steps may help lower your risk for cancer. · Stay active. Being active can help you get to and stay at a healthy weight. Try to exercise on most days of the week. Walking is a good choice. · Eat well. Choose a variety of vegetables, fruits, legumes (such as peas and beans), fish, poultry, and whole grains. · Do not smoke. If you need help quitting, talk to your doctor about stop-smoking programs and medicines.  These can increase your chances of quitting for good. · If you drink alcohol, limit how much you drink. Limit alcohol to 2 drinks a day for men and 1 drink a day for women. When should you call for help? Call your doctor now or seek immediate medical care if:    · You have severe belly pain.     · Your stools are maroon or very bloody.    Watch closely for changes in your health, and be sure to contact your doctor if:    · You have a fever.     · You have nausea or vomiting.     · You have a change in bowel habits (new constipation or diarrhea).     · Your symptoms get worse or are not improving as expected. Where can you learn more? Go to http://gabriel-foster.info/. Enter [id number] in the search box to learn more about \"Colon Polyps: Care Instructions. \"  Current as of: December 19, 2018  Content Version: 12.2  © 7778-0642 [employer]. Care instructions adapted under license by [employer] (which disclaims liability or warranty for this information). If you have questions about a medical condition or this instruction, always ask your healthcare professional. [address] any warranty or liability for your use of this information.

## 2019-12-12 ENCOUNTER — OFFICE VISIT (OUTPATIENT)
Dept: OBGYN CLINIC | Age: 59
End: 2019-12-12

## 2019-12-12 VITALS
DIASTOLIC BLOOD PRESSURE: 86 MMHG | HEIGHT: 60 IN | BODY MASS INDEX: 23.36 KG/M2 | WEIGHT: 119 LBS | SYSTOLIC BLOOD PRESSURE: 124 MMHG

## 2019-12-12 DIAGNOSIS — Z12.39 BREAST CANCER SCREENING: Primary | ICD-10-CM

## 2019-12-12 DIAGNOSIS — Z01.419 ENCOUNTER FOR GYNECOLOGICAL EXAMINATION WITHOUT ABNORMAL FINDING: ICD-10-CM

## 2019-12-12 NOTE — PROGRESS NOTES
Annual Exam    Srinivas Torrez is a 60 y.o.  postmenopausal female presenting for annual exam. Does c/o vaginal dryness and associated dyspareunia, but declines any medication. Denies VMS/hot flashes. Otherwise doing well. Previously evaluated for enlarged inguinal nodes on left. Biopsy showing necrotizing granulomatous inflammation, no evidence of malignancy. Lymph nodes are now decreasing in size. PMH of hypothyroid, celiac, colon polyp, and osteoporosis    Ob/Gyn Hx:   A0 -  x4  Menopause- age 38  PMB-denies  VMS-denies  STI- denies  ? SA- yes    Health maintenance:  Pap- 2018, NIL HPV Negative  Mammo- 1/3/19 B1  Colonoscopy- 19 polyp --> repeat 3 years advised by Dr. Ramon Barker -  showing osteoporosis, on fosamax, ca/vitD, wt bearing exercise      Past Medical History:   Diagnosis Date    Celiac disease     SY (generalized anxiety disorder)     Hashimoto's disease     Mitral regurgitation     Osteoporosis        Past Surgical History:   Procedure Laterality Date    COLONOSCOPY N/A 2019    COLONOSCOPY WTIH EMR performed by Chapin Lagunas MD at [facility] ENDOSCOPY    COLONOSCOPY N/A 2019    COLONOSCOPY performed by Chapin Lagunas MD at [facility] ENDOSCOPY    HX CERVICAL POLYPECTOMY  2018    removed - Dr. Raquel Guerrero      tubal ligation    HX OTHER SURGICAL  2017    Left Inguinal Node Biopsy       Family History   Problem Relation Age of Onset    No Known Problems Mother     Cancer Father         gleoblastoma- brain    Diabetes Maternal Grandmother     Cancer Maternal Grandfather         liver    No Known Problems Paternal Grandmother     No Known Problems Paternal Grandfather        Social History     Socioeconomic History    Marital status:      Spouse name: Not on file    Number of children: Not on file    Years of education: Not on file    Highest education level: Not on file   Occupational History    Not on file   Social Needs  Financial resource strain: Not on file   Mer[place] insecurity:     Worry: Not on file     Inability: Not on file    Transportation needs:     Medical: Not on file     Non-medical: Not on file   Tobacco Use    Smoking status: Former Smoker     Years: 9.00     Last attempt to quit: 2006     Years since quittin.9    Smokeless tobacco: Never Used   Substance and Sexual Activity    Alcohol use: No    Drug use: No    Sexual activity: Yes     Partners: Male     Birth control/protection: Surgical   Lifestyle    Physical activity:     Days per week: Not on file     Minutes per session: Not on file    Stress: Not on file   Relationships    Social connections:     Talks on phone: Not on file     Gets together: Not on file     Attends [employer] service: Not on file     Active member of club or organization: Not on file     Attends meetings of clubs or organizations: Not on file     Relationship status: Not on file    Intimate partner violence:     Fear of current or ex partner: Not on file     Emotionally abused: Not on file     Physically abused: Not on file     Forced sexual activity: Not on file   Other Topics Concern    Not on file   Social History Narrative    Not on file       Current Outpatient Medications   Medication Sig Dispense Refill    aspirin 81 mg chewable tablet Take 81 mg by mouth daily.  alendronate (FOSAMAX) 70 mg tablet Take 70 mg by mouth every seven (7) days.  calcium carbonate (CALCIUM 600 PO) Take 600 mg by mouth daily.  mv-min/iron/folic/calcium/vitK (WOMEN'S MULTIVITAMIN PO) Take  by mouth.  SYNTHROID 25 mcg tablet Take 25 mcg by mouth Daily (before breakfast).          Allergies   Allergen Reactions    Penicillins Hives    Wheat Unknown (comments)     Barely, rye (Celiac)       Review of Systems - History obtained from the patient  Constitutional: negative for weight loss, fever, night sweats  HEENT: negative for hearing loss, earache, congestion, snoring, sorethroat  CV: negative for chest pain, palpitations, edema  Resp: negative for cough, shortness of breath, wheezing  GI: negative for change in bowel habits, abdominal pain, black or bloody stools  : negative for frequency, dysuria, hematuria, vaginal discharge, +vaginal dryness/dyspareunia  MSK: negative for back pain, joint pain, muscle pain  Breast: negative for breast lumps, nipple discharge, galactorrhea  Skin :negative for itching, rash, hives  Neuro: negative for dizziness, headache, confusion, weakness  Psych: negative for anxiety, depression, change in mood  Heme/lymph: negative for bleeding, bruising, pallor    Physical Exam  Visit Vitals  /86 (BP 1 Location: Left arm, BP Patient Position: Sitting)   Ht 5' (1.524 m)   Wt 119 lb (54 kg)   BMI 23.24 kg/m²     Constitutional  · Appearance: well-nourished, well developed, alert, in no acute distress    HENT  · Head and Face: appears normal    Chest  · Respiratory Effort: non-labored breathing    Cardiovascular  · Extremities: no peripheral edema    Gastrointestinal  · Abdominal Examination: abdomen non-tender to palpation, normal bowel sounds, no masses present  · Liver and spleen: no hepatomegaly present, spleen not palpable  · Hernias: no hernias identified    Genitourinary  · External Genitalia: normal appearance for age, no discharge present, no tenderness present, no inflammatory lesions present, no masses present, +vulvovaginal atrophy present  · Vagina: normal vaginal vault without central or paravaginal defects, no discharge present, no inflammatory lesions present, no masses present, +pale atrophic vaginal mucosa  · Bladder: non-tender to palpation  · Urethra: appears normal  · Cervix: normal   · Uterus: normal size, shape and consistency  · Adnexa: no adnexal tenderness present, no adnexal masses present  · Perineum: perineum within normal limits, no evidence of trauma, no rashes or skin lesions present  · Inguinal nodes: no lymphadenopathy    Skin  · General Inspection: no rash, no lesions identified    Neurologic/Psychiatric  · Mental Status:  · Orientation: grossly oriented to person, place and time  · Mood and Affect: mood normal, affect appropriate      Assessment/Plan:  60 y.o. postmenopausal female presenting for AE.       Health maintenance:  -diet/exercise/healthy lifestyle  -pap/HPV next 2023  -STI screen declined  -refer for mammography  -colonoscopy next 2022  -dexa 2018 osteoporosis - continue fosamax, ca/vitD, wt bearing exercise per PCP/endocrine    RTC 1 year for AE, or sooner prbeverly Tomlinson MD  12/12/2019  9:14 AM

## 2020-01-06 ENCOUNTER — HOSPITAL ENCOUNTER (OUTPATIENT)
Dept: MAMMOGRAPHY | Age: 60
Discharge: HOME | End: 2020-01-06
Attending: OBSTETRICS & GYNECOLOGY
Payer: COMMERCIAL

## 2020-01-06 DIAGNOSIS — Z12.39 BREAST CANCER SCREENING: ICD-10-CM

## 2020-01-06 PROCEDURE — 77063 BREAST TOMOSYNTHESIS BI: CPT

## 2022-03-18 PROBLEM — K90.0 CELIAC DISEASE: Status: ACTIVE | Noted: 2017-12-01

## 2022-03-19 PROBLEM — Z98.890 H/O COLONOSCOPY: Status: ACTIVE | Noted: 2018-01-29

## 2022-03-19 PROBLEM — E55.9 VITAMIN D INSUFFICIENCY: Status: ACTIVE | Noted: 2018-01-29

## 2022-03-19 PROBLEM — I34.0 MITRAL REGURGITATION: Status: ACTIVE | Noted: 2018-01-29

## 2022-03-19 PROBLEM — E06.3 HASHIMOTO'S DISEASE: Status: ACTIVE | Noted: 2017-12-01

## 2022-03-19 PROBLEM — F41.1 GENERALIZED ANXIETY DISORDER: Status: ACTIVE | Noted: 2017-12-01

## 2022-03-20 PROBLEM — R09.89 BRUIT OF LEFT CAROTID ARTERY: Status: ACTIVE | Noted: 2018-10-01

## 2022-03-20 PROBLEM — M81.0 OSTEOPOROSIS: Status: ACTIVE | Noted: 2017-12-01

## 2022-08-15 NOTE — PROGRESS NOTES
Annual Exam    Roosevelt Coreas is a 57 y.o.  postmenopausal female presenting for annual exam. Does c/o vaginal dryness and associated dyspareunia, but declines any medication. Denies VMS/hot flashes. Otherwise doing well. Previously evaluated for enlarged inguinal nodes on left. Biopsy showing necrotizing granulomatous inflammation, no evidence of malignancy. Lymph nodes are now decreasing in size. PMH of hypothyroid, celiac, colon polyp, and osteoporosis    Ob/Gyn Hx:   A0 -  x4  Menopause- age 38  PMB-denies  VMS-denies  STI- denies  ? SA- yes    Health maintenance:  Pap- 2018, NIL HPV Negative  Mammo- 1/3/19 B1  Colonoscopy- 19 polyp --> repeat 3 years advised by Dr. Jesus Manuel Nevarez -  showing osteoporosis, on fosamax, ca/vitD, wt bearing exercise      Past Medical History:   Diagnosis Date    Celiac disease     SY (generalized anxiety disorder)     Hashimoto's disease     Mitral regurgitation     Osteoporosis        Past Surgical History:   Procedure Laterality Date    COLONOSCOPY N/A 2019    COLONOSCOPY WTIH EMR performed by Jerald Mann MD at [facility] ENDOSCOPY    COLONOSCOPY N/A 2019    COLONOSCOPY performed by Jerald Mann MD at [facility] ENDOSCOPY    HX CATARACT REMOVAL      HX CERVICAL POLYPECTOMY      removed - Dr. Mcknight Hazard    HX GYN  2001    tubal ligation    HX OTHER SURGICAL  2017    Left Inguinal Node Biopsy       Family History   Problem Relation Age of Onset    No Known Problems Mother     Cancer Father         gleoblastoma- brain    Diabetes Maternal Grandmother     Cancer Maternal Grandfather         liver    No Known Problems Paternal Grandmother     No Known Problems Paternal Grandfather        Social History     Socioeconomic History    Marital status:      Spouse name: Not on file    Number of children: Not on file    Years of education: Not on file    Highest education level: Not on file   Occupational History    Not on file   Tobacco Use    Smoking status: Former     Years: 9.00     Types: Cigarettes     Quit date:      Years since quittin.6    Smokeless tobacco: Never   Substance and Sexual Activity    Alcohol use: No    Drug use: No    Sexual activity: Not Currently     Partners: Male     Birth control/protection: Surgical   Other Topics Concern    Not on file   Social History Narrative    Not on file     Social Determinants of Health     Financial Resource Strain: Not on file   Food Insecurity: Not on file   Transportation Needs: Not on file   Physical Activity: Not on file   Stress: Not on file   Social Connections: Not on file   Intimate Partner Violence: Not on file   Housing Stability: Not on file       Current Outpatient Medications   Medication Sig Dispense Refill    aspirin 81 mg chewable tablet Take 81 mg by mouth daily. alendronate (FOSAMAX) 70 mg tablet Take 70 mg by mouth every seven (7) days. calcium carbonate (CALCIUM 600 PO) Take 600 mg by mouth daily. mv-min/iron/folic/calcium/vitK (WOMEN'S MULTIVITAMIN PO) Take  by mouth. SYNTHROID 25 mcg tablet Take 25 mcg by mouth Daily (before breakfast).          Allergies   Allergen Reactions    Penicillins Hives    Wheat Unknown (comments)     Barely, rye (Celiac)       Review of Systems - History obtained from the patient  Constitutional: negative for weight loss, fever, night sweats  HEENT: negative for hearing loss, earache, congestion, snoring, sorethroat  CV: negative for chest pain, palpitations, edema  Resp: negative for cough, shortness of breath, wheezing  GI: negative for change in bowel habits, abdominal pain, black or bloody stools  : negative for frequency, dysuria, hematuria, vaginal discharge, +vaginal dryness/dyspareunia  MSK: negative for back pain, joint pain, muscle pain  Breast: negative for breast lumps, nipple discharge, galactorrhea  Skin :negative for itching, rash, hives  Neuro: negative for dizziness, headache, confusion, weakness  Psych: negative for anxiety, depression, change in mood  Heme/lymph: negative for bleeding, bruising, pallor    Physical Exam  There were no vitals taken for this visit. Constitutional  Appearance: well-nourished, well developed, alert, in no acute distress    HENT  Head and Face: appears normal    Chest  Respiratory Effort: non-labored breathing    Cardiovascular  Extremities: no peripheral edema    Gastrointestinal  Abdominal Examination: abdomen non-tender to palpation, normal bowel sounds, no masses present  Liver and spleen: no hepatomegaly present, spleen not palpable  Hernias: no hernias identified    Genitourinary  External Genitalia: normal appearance for age, no discharge present, no tenderness present, no inflammatory lesions present, no masses present, +vulvovaginal atrophy present  Vagina: normal vaginal vault without central or paravaginal defects, no discharge present, no inflammatory lesions present, no masses present, +pale atrophic vaginal mucosa  Bladder: non-tender to palpation  Urethra: appears normal  Cervix: normal   Uterus: normal size, shape and consistency  Adnexa: no adnexal tenderness present, no adnexal masses present  Perineum: perineum within normal limits, no evidence of trauma, no rashes or skin lesions present  Inguinal nodes: no lymphadenopathy    Skin  General Inspection: no rash, no lesions identified    Neurologic/Psychiatric  Mental Status:  Orientation: grossly oriented to person, place and time  Mood and Affect: mood normal, affect appropriate      Assessment/Plan:  57 y.o. postmenopausal female presenting for AE.       Health maintenance:  -diet/exercise/healthy lifestyle  -pap/HPV next 2023  -STI screen declined  -refer for mammography  -colonoscopy next 2022  -dexa 2018 osteoporosis - continue fosamax, ca/vitD, wt bearing exercise per PCP/endocrine    RTC 1 year for AE, or sooner prn

## 2022-08-16 ENCOUNTER — OFFICE VISIT (OUTPATIENT)
Dept: OBGYN CLINIC | Age: 62
End: 2022-08-16
Payer: COMMERCIAL

## 2022-08-16 VITALS
BODY MASS INDEX: 23.75 KG/M2 | DIASTOLIC BLOOD PRESSURE: 72 MMHG | WEIGHT: 121 LBS | HEIGHT: 60 IN | SYSTOLIC BLOOD PRESSURE: 118 MMHG

## 2022-08-16 DIAGNOSIS — Z11.51 SCREENING FOR HUMAN PAPILLOMAVIRUS: ICD-10-CM

## 2022-08-16 DIAGNOSIS — M81.0 OSTEOPOROSIS, UNSPECIFIED OSTEOPOROSIS TYPE, UNSPECIFIED PATHOLOGICAL FRACTURE PRESENCE: ICD-10-CM

## 2022-08-16 DIAGNOSIS — Z12.31 ENCOUNTER FOR SCREENING MAMMOGRAM FOR MALIGNANT NEOPLASM OF BREAST: ICD-10-CM

## 2022-08-16 DIAGNOSIS — Z01.419 WELL WOMAN EXAM: Primary | ICD-10-CM

## 2022-08-16 PROCEDURE — 99396 PREV VISIT EST AGE 40-64: CPT | Performed by: OBSTETRICS & GYNECOLOGY

## 2022-08-16 NOTE — PROGRESS NOTES
Annual Exam    Arturo Wong is a 57 y.o.  postmenopausal female presenting for annual exam.    +vaginal dryness and associated dyspareunia, but declines any medication. Denies VMS/hot flashes. Otherwise doing well. Previously evaluated for enlarged inguinal nodes on left. Biopsy showing necrotizing granulomatous inflammation, no evidence of malignancy. Lymph nodes are now decreasing in size. PMH of hypothyroid, celiac, colon polyp, and osteoporosis. Declined covid vaccine. Still working, plans to retire this year. 7 grandchildren. She referred one of her daughters, Dallas Villegas, as a pt.     Ob/Gyn Hx:   A0 -  x4  Menopause- age 38  PMB-denies  VMS-denies  STI- denies  ? SA- not currently    Health maintenance:  Pap- 2018, NILM HPV Negative  Mammo- 2020 B1  Colonoscopy- 19 polyp --> repeat 3 years advised by Dr. Guerrero Jerome -  showing osteoporosis, on fosamax, ca/vitD, wt bearing exercise --> dexa scheduled      Past Medical History:   Diagnosis Date    Celiac disease     SY (generalized anxiety disorder)     Hashimoto's disease     Mitral regurgitation     Osteoporosis        Past Surgical History:   Procedure Laterality Date    COLONOSCOPY N/A 2019    COLONOSCOPY WTIH EMR performed by Enmanuel Zuleta MD at [facility] ENDOSCOPY    COLONOSCOPY N/A 2019    COLONOSCOPY performed by Enmanuel Zuleta MD at [facility] ENDOSCOPY    HX CATARACT REMOVAL      HX CERVICAL POLYPECTOMY      removed - Dr. Deo Ponce    HX GYN  2001    tubal ligation    HX OTHER SURGICAL  2017    Left Inguinal Node Biopsy       Family History   Problem Relation Age of Onset    No Known Problems Mother     Cancer Father         gleoblastoma- brain    Diabetes Maternal Grandmother     Cancer Maternal Grandfather         liver    No Known Problems Paternal Grandmother     No Known Problems Paternal Grandfather     Other Daughter         PCOS       Social History     Socioeconomic History    Marital status:      Spouse name: Not on file    Number of children: Not on file    Years of education: Not on file    Highest education level: Not on file   Occupational History    Not on file   Tobacco Use    Smoking status: Former     Years: 9.00     Types: Cigarettes     Quit date:      Years since quittin.6    Smokeless tobacco: Never   Substance and Sexual Activity    Alcohol use: No    Drug use: No    Sexual activity: Not Currently     Partners: Male     Birth control/protection: Surgical   Other Topics Concern    Not on file   Social History Narrative    Not on file     Social Determinants of Health     Financial Resource Strain: Not on file   Food Insecurity: Not on file   Transportation Needs: Not on file   Physical Activity: Not on file   Stress: Not on file   Social Connections: Not on file   Intimate Partner Violence: Not on file   Housing Stability: Not on file       Current Outpatient Medications   Medication Sig Dispense Refill    aspirin 81 mg chewable tablet Take 81 mg by mouth daily. alendronate (FOSAMAX) 70 mg tablet Take 70 mg by mouth every seven (7) days. calcium carbonate (CALCIUM 600 PO) Take 600 mg by mouth daily. mv-min/iron/folic/calcium/vitK (WOMEN'S MULTIVITAMIN PO) Take  by mouth. SYNTHROID 25 mcg tablet Take 25 mcg by mouth Daily (before breakfast).          Allergies   Allergen Reactions    Penicillins Hives    Wheat Unknown (comments)     Barely, rye (Celiac)       Review of Systems - History obtained from the patient  Constitutional: negative for weight loss, fever, night sweats  HEENT: negative for hearing loss, earache, congestion, snoring, sorethroat  CV: negative for chest pain, palpitations, edema  Resp: negative for cough, shortness of breath, wheezing  GI: negative for change in bowel habits, abdominal pain, black or bloody stools  : negative for frequency, dysuria, hematuria, vaginal discharge, +vaginal dryness/dyspareunia  MSK: negative for back pain, joint pain, muscle pain  Breast: negative for breast lumps, nipple discharge, galactorrhea  Skin :negative for itching, rash, hives  Neuro: negative for dizziness, headache, confusion, weakness  Psych: negative for anxiety, depression, change in mood  Heme/lymph: negative for bleeding, bruising, pallor    Physical Exam  Visit Vitals  /72   Ht 5' (1.524 m)   Wt 121 lb (54.9 kg)   BMI 23.63 kg/m²     PHYSICAL EXAMINATION    Constitutional  Appearance: well-nourished, well developed, alert, in no acute distress    HENT  Head and Face: appears normal    Neck  Inspection/Palpation: normal appearance, no masses or tenderness  Lymph Nodes: no lymphadenopathy present  Thyroid: gland size normal, nontender, no nodules or masses present on palpation    Chest  Respiratory Effort: breathing non-labored  Auscultation: normal breath sounds    Cardiovascular  Heart:   Auscultation: regular rate and rhythm without murmur    Breasts  Inspection of Breasts: breasts symmetrical, no skin changes, no discharge present, nipple appearance normal, no skin retraction present  Palpation of Breasts and Axillae: no masses present on palpation, no breast tenderness  Axillary Lymph Nodes: no lymphadenopathy present    Gastrointestinal  Abdominal Examination: abdomen non-tender to palpation, normal bowel sounds, no masses present  Liver and spleen: no hepatomegaly present, spleen not palpable  Hernias: no hernias identified    Genitourinary  External Genitalia: normal appearance for age, no discharge present, no tenderness present, no inflammatory lesions present, no masses present, +atrophy present  Vagina: normal vaginal vault without central or paravaginal defects, no discharge present, no inflammatory lesions present, no masses present  Bladder: non-tender to palpation  Urethra: appears normal  Cervix: normal   Uterus: normal size, shape and consistency  Adnexa: no adnexal tenderness present, no adnexal masses present  Perineum: perineum within normal limits, no evidence of trauma, no rashes or skin lesions present  Anus: anus within normal limits, no hemorrhoids present  Inguinal Lymph Nodes: no lymphadenopathy present    Skin  General Inspection: no rash, no lesions identified    Neurologic/Psychiatric  Mental Status:  Orientation: grossly oriented to person, place and time  Mood and Affect: mood normal, affect appropriate      Assessment/Plan:  57 y.o. postmenopausal female presenting for AE. Doing well.     Health maintenance:  -diet/exercise/healthy lifestyle  -pap/HPV  today  -STI screen declined  -refer for mammography  -colonoscopy due this year, pt follows with Dr. Carol iMlian  -osteoporosis - continue fosamax, ca/vitD, wt bearing exercise per PCP/endocrine --> has next dexa scheduled    RTC 1 year for AE, or sooner lety Vallejo MD  8/16/2022  9:21 AM

## 2022-08-17 ENCOUNTER — TRANSCRIBE ORDER (OUTPATIENT)
Dept: SCHEDULING | Age: 62
End: 2022-08-17

## 2022-08-17 DIAGNOSIS — Z12.31 VISIT FOR SCREENING MAMMOGRAM: Primary | ICD-10-CM

## 2022-08-21 LAB
CYTOLOGIST CVX/VAG CYTO: NORMAL
CYTOLOGY CVX/VAG DOC CYTO: NORMAL
CYTOLOGY CVX/VAG DOC THIN PREP: ABNORMAL
DX ICD CODE: NORMAL
HPV GENOTYPE 18,45: POSITIVE
HPV I/H RISK 4 DNA CVX QL PROBE+SIG AMP: POSITIVE
HPV16 DNA CVX QL PROBE+SIG AMP: NEGATIVE
Lab: ABNORMAL
OTHER STN SPEC: NORMAL
STAT OF ADQ CVX/VAG CYTO-IMP: NORMAL

## 2022-08-22 NOTE — PROGRESS NOTES
Pap NILM HPV+ (HR subtype 18/45+) --> needs colposcopy --> please inform patient and help arrange. THanks!

## 2022-08-24 ENCOUNTER — TELEPHONE (OUTPATIENT)
Dept: OBGYN CLINIC | Age: 62
End: 2022-08-24

## 2022-08-24 NOTE — PROGRESS NOTES
Attempted to contact patient. Left voicemail message to return call to the office to discuss pap results and schedule follow up.

## 2022-08-25 ENCOUNTER — TELEPHONE (OUTPATIENT)
Dept: OBGYN CLINIC | Age: 62
End: 2022-08-25

## 2022-08-25 NOTE — TELEPHONE ENCOUNTER
Pt of SP LM this morning at 1007 asking if she could have a PAP  redo instead of colposcopy. Appt for colposcopy scheduled for Sept 7 previously. Please advise    Carmen Lopez MD  You 2 minutes ago (1:25 PM)     SP  Strongly recommend colposcopy as high-risk strain of HPV was present. Thanks    Spoke with pt regarding above message from Dr. Sherry Bermudez. Pt verbalized understanding and states she will keep colposcopy appt on Sept 7,2022.

## 2022-09-07 ENCOUNTER — HOSPITAL ENCOUNTER (OUTPATIENT)
Dept: LAB | Age: 62
Discharge: HOME | End: 2022-09-07

## 2022-09-07 ENCOUNTER — OFFICE VISIT (OUTPATIENT)
Dept: OBGYN CLINIC | Age: 62
End: 2022-09-07
Payer: COMMERCIAL

## 2022-09-07 VITALS — DIASTOLIC BLOOD PRESSURE: 76 MMHG | WEIGHT: 122 LBS | SYSTOLIC BLOOD PRESSURE: 126 MMHG | BODY MASS INDEX: 23.83 KG/M2

## 2022-09-07 DIAGNOSIS — R87.618 PAP SMEAR ABNORMALITY OF CERVIX/HUMAN PAPILLOMAVIRUS (HPV) POSITIVE: Primary | ICD-10-CM

## 2022-09-07 PROCEDURE — 57454 BX/CURETT OF CERVIX W/SCOPE: CPT | Performed by: OBSTETRICS & GYNECOLOGY

## 2022-09-07 NOTE — PROGRESS NOTES
[facility] AT [address] [place] [address]  OFFICE PROCEDURE PROGRESS NOTE        Chart reviewed for the following:   Kiah Li, have reviewed the History, Physical and updated the Allergic reactions for Haleigh Lehman performed immediately prior to start of procedure:   Kiah Li, have performed the following reviews on Haleigh Roberts prior to the start of the procedure:            * Patient was identified by name and date of birth   * Agreement on procedure being performed was verified  * Risks and Benefits explained to the patient  * Procedure site verified and marked as necessary  * Patient was positioned for comfort  * Consent was signed and verified     Time: 8:10 AM      Date of procedure: 9/7/2022    Procedure performed by:  Rubbie Klinefelter, MD    How tolerated by patient: tolerated the procedure well with no complications    Post Procedural Pain Scale: 0 - No Hurt    Comments: none    Procedure Note: Colposcopy  Patient is a 57 y.o. WHITE/NON- with recent NILM HPV + 18/45+ pap, who presents for colposcopy. No history of abnormal paps. No LMP recorded. Patient is postmenopausal..    After being presented with the risks, benefits and alternatives has she signed a consent for the procedure. She states that she understands the need for the procedure and has no further questions. She was informed that she may experience discomfort. Procedure: She was positioned in the dorsal lithotomy position and a speculum was inserted into the vagina. Dilute acetic acid was applied to the cervix. The colposcope was used to visualize the cervix. The transformation zone was completely visualized. Ectocervical biopsies were NOT taken. Endocervical curettage (ECC) was performed. Specimen were placed in formalin and sent to pathology. Monsels solution was applied to biopsy sites with excellent hemostasis. There were no complications. Findings: Adequate colposcopy.  Squamo-columnar junction (SCJ) was visualized in entirety (marked in black on diagram). No acetowhite changes [facility]) were noted. Post Procedure Status: The patient tolerated the procedure well with minimal discomfort.    Plan: pending pathology    Carissa Gilmore MD  9/7/2022  9:27 AM

## 2022-09-13 NOTE — PROGRESS NOTES
ECC - concerning for adenocarcinoma in situ (at least), cannot definitively characterize primary site of malignancy. I have spoken with patient and informed her of results. Also informed her that we would be placing a referral to [address] [facility]. Please place referral to Dr. Deepthi Wasserman or Dr. Jt Zuniga. Pt has their office phone number and will call to set up an appointment. Thank you.

## 2022-09-14 DIAGNOSIS — C53.0 MALIGNANT NEOPLASM OF ENDOCERVIX (HCC): Primary | ICD-10-CM

## 2022-09-19 NOTE — PROGRESS NOTES
New Patient, Referred by Dr. Rich Tellez, colposcopy done on 9/7/22, Endocervical curettage, Adenocarcinoma in situ (at least)    1. Have you been to the ER, urgent care clinic since your last visit? Hospitalized since your last visit?  no    2. Have you seen or consulted any other health care providers outside of the [address] [place] [address] since your last visit? Include any pap smears or colon screening.    no

## 2022-09-21 ENCOUNTER — OFFICE VISIT (OUTPATIENT)
Dept: GYNECOLOGY | Age: 62
End: 2022-09-21
Payer: COMMERCIAL

## 2022-09-21 VITALS
WEIGHT: 122.6 LBS | BODY MASS INDEX: 24.07 KG/M2 | HEART RATE: 80 BPM | HEIGHT: 60 IN | DIASTOLIC BLOOD PRESSURE: 69 MMHG | SYSTOLIC BLOOD PRESSURE: 122 MMHG

## 2022-09-21 DIAGNOSIS — D06.9 ADENOCARCINOMA IN SITU (AIS) OF UTERINE CERVIX: Primary | ICD-10-CM

## 2022-09-21 PROCEDURE — 99204 OFFICE O/P NEW MOD 45 MIN: CPT | Performed by: OBSTETRICS & GYNECOLOGY

## 2022-09-21 NOTE — LETTER
9/21/2022    Patient: Rosalio Landeros   YOB: 1960   Date of Visit: 9/21/2022     Francisca Long NP  [address] [facility] [address]  Via Fax: [address], [place]  [address] [facility] [address]  Via In Basket    Dear LLUVIA Lopez MD,      Thank you for referring Ms. Rosalio Landeros to [address] [place] [address] for evaluation. My notes for this consultation are attached. If you have questions, please do not hesitate to call me. I look forward to following your patient along with you.       Sincerely,    Rayna George MD

## 2022-09-21 NOTE — PROGRESS NOTES
[facility]YNECOLOGIC ONCOLOGY  [address] [facility], [address]  P [phone]  F [phone]    Office Note  Patient ID:  Name:  Celeste Cook  MRN:  [id number]  :  1960/62 y.o. Date:  2022      HISTORY OF PRESENT ILLNESS:  Ms. Celeste Cook is a 60 y.o. female who presents as a new patient from Dr. Meg Eugene for cervical AIS. Pap smear was normal, but HR HPV+, 18/45. Colposcopy with ECC demonstrated AIS. Referred to our office for further discussion and management. Denies vaginal bleeding or discharge. Denies all other complaints. Denies prior abnormal pap smears. Pathology Review:   2022:  * * *FINAL PATHOLOGIC DIAGNOSIS* * *   Endocervical curettage:        Adenocarcinoma in situ (at least), see comment. * * *Comment* * *   The nuclear and architectural features are compatible with at least   adenocarcinoma in situ. The tissue is fragmented and limited, precluding   further evaluation to exclude an invasive tumor. Immunohistochemical   stains (p16, p53) were performed on block 1A with appropriately reactive   controls. The p16 is diffusely positive with scattered p53 positivity. While this favors endocervical origin, correlation with clinical and   radiographic impression is suggested to definitively characterize the   primary site of this malignancy. Clinical correlation is recommended. This case was shown to one or more pathologists for consultation, and they   concur with the diagnosis. ROS:  A comprehensive review of systems was negative except for that written in the History of Present Illness.  , 10 point ROS      ECOG stGstrstastdstest:st st1st Problem List:  Patient Active Problem List    Diagnosis Date Noted    Adenocarcinoma in situ (AIS) of uterine cervix 2022    Bruit of left carotid artery 10/01/2018    H/O colonoscopy 2018    Mitral regurgitation 2018    Vitamin D insufficiency 2018    Hashimoto's disease 2017    Celiac disease 2017    Generalized anxiety disorder 2017    Osteoporosis 2017     PMH:  Past Medical History:   Diagnosis Date    Celiac disease     SY (generalized anxiety disorder)     Hashimoto's disease     Mitral regurgitation     Osteoporosis       PSH:  Past Surgical History:   Procedure Laterality Date    COLONOSCOPY N/A 2019    COLONOSCOPY WTIH EMR performed by Avinash Nunez MD at [facility] ENDOSCOPY    COLONOSCOPY N/A 2019    COLONOSCOPY performed by Avinash Nunez MD at [facility] ENDOSCOPY    HX CATARACT REMOVAL      HX CERVICAL POLYPECTOMY  2018    removed - Dr. Su Lowers    HX GYN  2001    tubal ligation    HX OTHER SURGICAL  2017    Left Inguinal Node Biopsy      Social History:  Social History     Tobacco Use    Smoking status: Former     Years: 9.00     Types: Cigarettes     Quit date:      Years since quittin.7    Smokeless tobacco: Never   Substance Use Topics    Alcohol use: No      Family History:  Family History   Problem Relation Age of Onset    No Known Problems Mother     Cancer Father         gleoblastoma- brain    Breast Cancer Sister     Diabetes Maternal Grandmother     Cancer Maternal Grandfather         liver    No Known Problems Paternal Grandmother     No Known Problems Paternal Grandfather     Other Daughter         PCOS      Medications: (reviewed)  Current Outpatient Medications   Medication Sig    aspirin 81 mg chewable tablet Take 81 mg by mouth daily. alendronate (FOSAMAX) 70 mg tablet Take 70 mg by mouth every seven (7) days. calcium carbonate (CALCIUM 600 PO) Take 600 mg by mouth daily. mv-min/iron/folic/calcium/vitK (WOMEN'S MULTIVITAMIN PO) Take  by mouth. SYNTHROID 25 mcg tablet Take 25 mcg by mouth Daily (before breakfast). No current facility-administered medications for this visit.      Allergies: (reviewed)  Allergies   Allergen Reactions    Penicillins Hives    Wheat Unknown (comments)     Barely, rye (Celiac) OBJECTIVE:    Physical Exam:  VITAL SIGNS: Vitals:    09/21/22 0834   BP: 122/69   Pulse: 80   Weight: 122 lb 9.6 oz (55.6 kg)   Height: 5' (1.524 m)     Body mass index is 23.94 kg/m². GENERAL DARCY: Conversant, alert, oriented. No acute distress. HEENT: HEENT. No thyroid enlargement. No JVD. Neck: Supple without restrictions. RESPIRATORY: Clear to auscultation and percussion to the bases. No CVAT. CARDIOVASC: RRR without murmur/rub. GASTROINT: soft, non-tender, without masses or organomegaly   MUSCULOSKEL: no joint tenderness, deformity or swelling       EXTREMITIES: extremities normal, atraumatic, no cyanosis or edema   PELVIC: Exam chaperoned by nurse. Normal appearing external genitalia. On speculum exam, normal appearing vagina and cervix. On bimanual exam, the cervix and uterus are normal size and mobile. No evidence of adnexal masses or nodularity. RECTAL: deferred   PATY SURVEY: No suspicious lymphadenopathy or edema noted. NEURO: Grossly intact. No acute deficit.        Lab Date as available:    Lab Results   Component Value Date/Time    WBC 4.4 01/18/2018 08:25 AM    HGB 14.0 01/18/2018 08:25 AM    HCT 42.8 01/18/2018 08:25 AM    PLATELET 799 13/78/3515 08:25 AM    MCV 91 01/18/2018 08:25 AM     Lab Results   Component Value Date/Time    Sodium 141 01/18/2018 08:25 AM    Potassium 4.3 01/18/2018 08:25 AM    Chloride 100 01/18/2018 08:25 AM    CO2 28 01/18/2018 08:25 AM    Glucose 87 01/18/2018 08:25 AM    BUN 21 01/18/2018 08:25 AM    Creatinine 0.75 01/18/2018 08:25 AM    BUN/Creatinine ratio 28 (H) 01/18/2018 08:25 AM    GFR est  01/18/2018 08:25 AM    GFR est non-AA 88 01/18/2018 08:25 AM    Calcium 9.5 01/18/2018 08:25 AM         IMPRESSION/PLAN:    Ms. Arturo Wong is a 60 y.o. female who presents with cervical AIS    Problems:     Patient Active Problem List    Diagnosis Date Noted    Adenocarcinoma in situ (AIS) of uterine cervix 09/21/2022    Bruit of left carotid artery 10/01/2018    H/O colonoscopy 01/29/2018    Mitral regurgitation 01/29/2018    Vitamin D insufficiency 01/29/2018    Hashimoto's disease 12/01/2017    Celiac disease 12/01/2017    Generalized anxiety disorder 12/01/2017    Osteoporosis 12/01/2017       I reviewed Ms. Flavio Roberts's course to date, including her medical records, recent studies, physical exam, and review of symptoms. I discussed the natural history of cervical dysplasia/AIS, including the role of HPV. Counseled patient regarding ASCCP recommendations for AIS. I have recommended CKC/ECC for both diagnostic and therapeutic purposes. This will help to rule out any micro-invasive disease. Given the patient's age, if her CKC is negative for malignancy, then I have recommended a simple TLH/BSO after she recovers from her CKC. If she has a malignancy present, then we will need to determine based on size of the lesion whether she requires a simple or radical hysterectomy. I briefly discussed these two procedures, but as stated above she will require CKC/ECC initially. Will post for surgery at the patient's convenience. Reviewed risks, benefits, indications, and alternatives of surgery. Will collect bhcg, CBCD, CMP, CXR, and EKG prior to surgery. All questions and concerns were addressed with the patient and she is comfortable with the plan.        Defined Sensitive Document    >50% of total time allocated to visit dedicated to counseling, 50 minutes total.    Signed By: Ramana Santillan MD     9/21/2022/7:39 AM

## 2022-09-27 ENCOUNTER — HOSPITAL ENCOUNTER (OUTPATIENT)
Dept: MAMMOGRAPHY | Age: 62
Discharge: HOME | End: 2022-09-27
Attending: OBSTETRICS & GYNECOLOGY
Payer: COMMERCIAL

## 2022-09-27 DIAGNOSIS — Z12.31 ENCOUNTER FOR SCREENING MAMMOGRAM FOR MALIGNANT NEOPLASM OF BREAST: ICD-10-CM

## 2022-09-27 PROCEDURE — 77063 BREAST TOMOSYNTHESIS BI: CPT

## 2022-10-21 ENCOUNTER — HOSPITAL ENCOUNTER (OUTPATIENT)
Dept: PREADMISSION TESTING | Age: 62
Discharge: HOME | End: 2022-10-21
Payer: COMMERCIAL

## 2022-10-21 ENCOUNTER — HOSPITAL ENCOUNTER (OUTPATIENT)
Dept: GENERAL RADIOLOGY | Age: 62
Discharge: HOME | End: 2022-10-21
Attending: OBSTETRICS & GYNECOLOGY
Payer: COMMERCIAL

## 2022-10-21 VITALS
SYSTOLIC BLOOD PRESSURE: 144 MMHG | WEIGHT: 121.6 LBS | TEMPERATURE: 98 F | HEIGHT: 60 IN | RESPIRATION RATE: 20 BRPM | DIASTOLIC BLOOD PRESSURE: 72 MMHG | BODY MASS INDEX: 23.87 KG/M2 | HEART RATE: 80 BPM

## 2022-10-21 LAB
ALBUMIN SERPL-MCNC: 4.1 G/DL (ref 3.5–5)
ALBUMIN/GLOB SERPL: 1.5 {RATIO} (ref 1.1–2.2)
ALP SERPL-CCNC: 54 U/L (ref 45–117)
ALT SERPL-CCNC: 26 U/L (ref 12–78)
ANION GAP SERPL CALC-SCNC: 6 MMOL/L (ref 5–15)
AST SERPL-CCNC: 28 U/L (ref 15–37)
ATRIAL RATE: 65 BPM
BASOPHILS # BLD: 0.1 K/UL (ref 0–0.1)
BASOPHILS NFR BLD: 1 % (ref 0–1)
BILIRUB SERPL-MCNC: 0.4 MG/DL (ref 0.2–1)
BUN SERPL-MCNC: 16 MG/DL (ref 6–20)
BUN/CREAT SERPL: 21 (ref 12–20)
CALCIUM SERPL-MCNC: 9.7 MG/DL (ref 8.5–10.1)
CALCULATED P AXIS, ECG09: 68 DEGREES
CALCULATED R AXIS, ECG10: -13 DEGREES
CALCULATED T AXIS, ECG11: 55 DEGREES
CHLORIDE SERPL-SCNC: 106 MMOL/L (ref 97–108)
CO2 SERPL-SCNC: 29 MMOL/L (ref 21–32)
CREAT SERPL-MCNC: 0.78 MG/DL (ref 0.55–1.02)
DIAGNOSIS, 93000: NORMAL
DIFFERENTIAL METHOD BLD: NORMAL
EOSINOPHIL # BLD: 0.2 K/UL (ref 0–0.4)
EOSINOPHIL NFR BLD: 4 % (ref 0–7)
ERYTHROCYTE [DISTWIDTH] IN BLOOD BY AUTOMATED COUNT: 12.2 % (ref 11.5–14.5)
GLOBULIN SER CALC-MCNC: 2.8 G/DL (ref 2–4)
GLUCOSE SERPL-MCNC: 95 MG/DL (ref 65–100)
HCT VFR BLD AUTO: 46 % (ref 35–47)
HGB BLD-MCNC: 15 G/DL (ref 11.5–16)
IMM GRANULOCYTES # BLD AUTO: 0 K/UL (ref 0–0.04)
IMM GRANULOCYTES NFR BLD AUTO: 0 % (ref 0–0.5)
LYMPHOCYTES # BLD: 1.6 K/UL (ref 0.8–3.5)
LYMPHOCYTES NFR BLD: 30 % (ref 12–49)
MCH RBC QN AUTO: 31.1 PG (ref 26–34)
MCHC RBC AUTO-ENTMCNC: 32.6 G/DL (ref 30–36.5)
MCV RBC AUTO: 95.2 FL (ref 80–99)
MONOCYTES # BLD: 0.7 K/UL (ref 0–1)
MONOCYTES NFR BLD: 13 % (ref 5–13)
NEUTS SEG # BLD: 2.7 K/UL (ref 1.8–8)
NEUTS SEG NFR BLD: 52 % (ref 32–75)
NRBC # BLD: 0 K/UL (ref 0–0.01)
NRBC BLD-RTO: 0 PER 100 WBC
P-R INTERVAL, ECG05: 150 MS
PLATELET # BLD AUTO: 202 K/UL (ref 150–400)
PMV BLD AUTO: 12.4 FL (ref 8.9–12.9)
POTASSIUM SERPL-SCNC: 4.3 MMOL/L (ref 3.5–5.1)
PROT SERPL-MCNC: 6.9 G/DL (ref 6.4–8.2)
Q-T INTERVAL, ECG07: 370 MS
QRS DURATION, ECG06: 80 MS
QTC CALCULATION (BEZET), ECG08: 384 MS
RBC # BLD AUTO: 4.83 M/UL (ref 3.8–5.2)
SODIUM SERPL-SCNC: 141 MMOL/L (ref 136–145)
VENTRICULAR RATE, ECG03: 65 BPM
WBC # BLD AUTO: 5.2 K/UL (ref 3.6–11)

## 2022-10-21 PROCEDURE — 36415 COLL VENOUS BLD VENIPUNCTURE: CPT

## 2022-10-21 PROCEDURE — 85025 COMPLETE CBC W/AUTO DIFF WBC: CPT

## 2022-10-21 PROCEDURE — 93005 ELECTROCARDIOGRAM TRACING: CPT

## 2022-10-21 PROCEDURE — 80053 COMPREHEN METABOLIC PANEL: CPT

## 2022-10-21 PROCEDURE — 71046 X-RAY EXAM CHEST 2 VIEWS: CPT

## 2022-10-21 RX ORDER — ASCORBIC ACID 500 MG
500 TABLET ORAL DAILY
COMMUNITY

## 2022-10-21 RX ORDER — TURMERIC 400 MG
CAPSULE ORAL DAILY
COMMUNITY

## 2022-10-21 NOTE — PERIOP NOTES
[address] [facility] INSTRUCTIONS    Surgery Date:   10-28-22    Your surgeon's office or [facility] staff will call you between 4 PM- 8 PM the day before surgery with your arrival time. If your surgery is on a Monday, you will receive a call the preceding Friday. Please report to [facility] Patient Access/Admitting on the 1st floor. Bring your insurance card, photo identification, and any copayment ( if applicable). If you are going home the same day of your surgery, you must have a responsible adult to drive you home. You need to have a responsible adult to stay with you the first 24 hours after surgery and you should not drive a car for 24 hours following your surgery. Nothing to eat or drink after midnight the night before surgery. This includes no water, gum, mints, coffee, juice, etc.  Please note special instructions, if applicable, below for medications. Do NOT drink alcohol or smoke 24 hours before surgery. STOP smoking for 14 days prior as it helps with breathing and healing after surgery. If you are being admitted to the hospital, please leave personal belongings/luggage in your car until you have an assigned hospital room number. Please wear comfortable clothes. Wear your glasses instead of contacts. We ask that all money, jewelry and valuables be left at home. Wear no make up, particularly mascara, the day of surgery. All body piercings, rings, and jewelry need to be removed and left at home. Please remove any nail polish or artificial nails from your fingernails. Please wear your hair loose or down. Please no pony-tails, buns, or any metal hair accessories. If you shower the morning of surgery, please do not apply any lotions or powders afterwards. You may wear deodorant, unless having breast surgery. Do not shave any body area within 24 hours of your surgery. Please follow all instructions to avoid any potential surgical cancellation.   Should your physical condition change, (i.e. fever, cold, flu, etc.) please notify your surgeon as soon as possible. It is important to be on time. If a situation occurs where you may be delayed, please call:  [phone] / [phone] on the day of surgery. The Preadmission Testing staff can be reached at [phone]. Special instructions: NONE      Current Outpatient Medications   Medication Sig    Omega-3 Fatty Acids 60- mg cpDR Take  by mouth daily. ascorbic acid, vitamin C, (Vitamin C) 500 mg tablet Take 500 mg by mouth daily. turmeric 400 mg cap Take  by mouth daily. grape seed extract (GRAPE SEED PO) Take  by mouth daily. vitamin H-U-G-lutein-minerals (OCUVITE) tablet 1 Tablet daily. aspirin 81 mg chewable tablet Take 81 mg by mouth daily. calcium carbonate (CALCIUM 600 PO) Take 600 mg by mouth daily. mv-min/iron/folic/calcium/vitK (WOMEN'S MULTIVITAMIN PO) Take  by mouth. SYNTHROID 25 mcg tablet Take 50 mcg by mouth Daily (before breakfast). No current facility-administered medications for this encounter. YOU MUST ONLY TAKE THESE MEDICATIONS THE MORNING OF SURGERY WITH A SIP OF WATER: SYNTHROID  MEDICATIONS TO TAKE THE MORNING OF SURGERY ONLY IF NEEDED: NONE  HOLD these prescription medications BEFORE Surgery: NONE  Ask your surgeon/prescribing physician about when/if to STOP taking these medications: ASPIRIN  Stop all vitamins, herbal medicines and Aspirin containing products 7 days prior to surgery. Stop any non-steroidal anti-inflammatory drugs (i.e. Ibuprofen, Naproxen, Advil, Aleve) 3 days before surgery. You may take Tylenol. If you are currently taking Plavix, Coumadin, or any other blood-thinning/anticoagulant medication contact your prescribing physician for instructions. Preventing Infections Before and After - Your Surgery    IMPORTANT INSTRUCTIONS      You play an important role in your health and preparation for surgery.  To reduce the germs on your skin you will need to shower with CHG soap (Chorhexidine gluconate 4%) two times before surgery. CHG soap (Hibiclens, Hex-A-Clens or store brand)  CHG soap will be provided at your Preadmission Testing (PAT) appointment. If you do not have a PAT appointment before surgery, you may arrange to  CHG soap from our office or purchase CHG soap at a pharmacy, grocery or department store. You need to purchase TWO 4 ounce bottles to use for your 2 showers. Steps to follow:  Britni Zimmermantis your hair with your normal shampoo and your body with regular soap and rinse well to remove shampoo and soap from your skin. Wet a clean washcloth and turn off the shower. Put CHG soap on washcloth and apply to your entire body from the neck down. Do not use on your head, face or private parts(genitals). Do not use CHG soap on open sores, wounds or areas of skin irritation. Wash you body gently for 5 minutes. Do not wash your skin too hard. This soap does not create lather. Pay special attention to your underarms and from your belly button to your feet. Turn the shower back on and rinse well to get CHG soap off your body. Pat your skin dry with a clean, dry towel. Do not apply lotions or moisturizer. Put on clean clothes and sleep on fresh bed sheets and do not allow pets to sleep with you. Shower with CHG soap 2 times before your surgery  The evening before your surgery  The morning of your surgery      Tips to help prevent infections after your surgery:  Protect your surgical wound from germs:  Hand washing is the most important thing you and your caregivers can do to prevent infections. Keep your bandage clean and dry! Do not touch your surgical wound. Use clean, freshly washed towels and washcloths every time you shower; do not share bath linens with others. Until your surgical wound is healed, wear clothing and sleep on bed linens each day that are clean and freshly washed.   Do not allow pets to sleep in your bed with you or touch your surgical wound. Do not smoke - smoking delays wound healing. This may be a good time to stop smoking. If you have diabetes, it is important for you to manage your blood sugar levels properly before your surgery as well as after your surgery. Poorly managed blood sugar levels slow down wound healing and prevent you from healing completely. Patient Information Regarding COVID Restrictions    Day of Procedure    Please park in the parking deck or any designated visitor parking lot. Enter the facility through the Main Entrance of the hospital.  On the day of surgery, please provide the cell phone number of the person who will be waiting for you to the Patient Access representative at the time of registration. Masks are highly recommended in the hospital, but not required. Once your procedure and the immediate recovery period is completed, a nurse in the recovery area will contact your designated visitor to inform them of your room number or to otherwise review other pertinent information regarding your care. Social distancing practices are strongly encouraged in waiting areas and the cafeteria. The patient was contacted  in person. She verbalized understanding of all instructions does not  need reinforcement.

## 2022-10-24 NOTE — PERIOP NOTES
Cory Hernandez NP CONSULTED ON EKG FROM 10/21/2022. LAINE CONSULTED TO HAVE ANESTHESIA REVIEW AND CONSULT. EKG FAXED TO ANESTHESIA. DR. Arnie Dakin ANESTHESIOLOGIST CONSULTED EKG OKAY FOR SURGERY. EKG FAXED TO DR. LOPEZ'S OFFICE.

## 2022-10-27 ENCOUNTER — ANESTHESIA EVENT (OUTPATIENT)
Dept: SURGERY | Age: 62
End: 2022-10-27
Payer: COMMERCIAL

## 2022-10-28 ENCOUNTER — HOSPITAL ENCOUNTER (OUTPATIENT)
Age: 62
Setting detail: OUTPATIENT SURGERY
Discharge: HOME | End: 2022-10-28
Attending: OBSTETRICS & GYNECOLOGY | Admitting: OBSTETRICS & GYNECOLOGY
Payer: COMMERCIAL

## 2022-10-28 ENCOUNTER — ANESTHESIA (OUTPATIENT)
Dept: SURGERY | Age: 62
End: 2022-10-28
Payer: COMMERCIAL

## 2022-10-28 VITALS
HEIGHT: 60 IN | OXYGEN SATURATION: 99 % | RESPIRATION RATE: 20 BRPM | HEART RATE: 78 BPM | TEMPERATURE: 97 F | SYSTOLIC BLOOD PRESSURE: 150 MMHG | DIASTOLIC BLOOD PRESSURE: 78 MMHG | WEIGHT: 121.6 LBS | BODY MASS INDEX: 23.87 KG/M2

## 2022-10-28 DIAGNOSIS — G89.18 POSTOPERATIVE PAIN: Primary | ICD-10-CM

## 2022-10-28 DIAGNOSIS — D06.9 ADENOCARCINOMA IN SITU (AIS) OF UTERINE CERVIX: ICD-10-CM

## 2022-10-28 PROCEDURE — 77030002996 HC SUT SLK J&J -A: Performed by: OBSTETRICS & GYNECOLOGY

## 2022-10-28 PROCEDURE — 77030013079 HC BLNKT BAIR HGGR 3M -A: Performed by: ANESTHESIOLOGY

## 2022-10-28 PROCEDURE — 77030018390 HC SPNG HEMSTAT2 J&J -B: Performed by: OBSTETRICS & GYNECOLOGY

## 2022-10-28 PROCEDURE — 76210000016 HC OR PH I REC 1 TO 1.5 HR: Performed by: OBSTETRICS & GYNECOLOGY

## 2022-10-28 PROCEDURE — 88305 TISSUE EXAM BY PATHOLOGIST: CPT

## 2022-10-28 PROCEDURE — 74011000250 HC RX REV CODE- 250: Performed by: ANESTHESIOLOGY

## 2022-10-28 PROCEDURE — 77030040361 HC SLV COMPR DVT MDII -B: Performed by: OBSTETRICS & GYNECOLOGY

## 2022-10-28 PROCEDURE — 77030042556 HC PNCL CAUT -B: Performed by: OBSTETRICS & GYNECOLOGY

## 2022-10-28 PROCEDURE — 74011250636 HC RX REV CODE- 250/636: Performed by: OBSTETRICS & GYNECOLOGY

## 2022-10-28 PROCEDURE — 2709999900 HC NON-CHARGEABLE SUPPLY: Performed by: OBSTETRICS & GYNECOLOGY

## 2022-10-28 PROCEDURE — 77030031139 HC SUT VCRL2 J&J -A: Performed by: OBSTETRICS & GYNECOLOGY

## 2022-10-28 PROCEDURE — 74011250637 HC RX REV CODE- 250/637: Performed by: ANESTHESIOLOGY

## 2022-10-28 PROCEDURE — 77030010509 HC AIRWY LMA MSK TELE -A: Performed by: ANESTHESIOLOGY

## 2022-10-28 PROCEDURE — 76210000020 HC REC RM PH II FIRST 0.5 HR: Performed by: OBSTETRICS & GYNECOLOGY

## 2022-10-28 PROCEDURE — 88342 IMHCHEM/IMCYTCHM 1ST ANTB: CPT

## 2022-10-28 PROCEDURE — 76060000033 HC ANESTHESIA 1 TO 1.5 HR: Performed by: OBSTETRICS & GYNECOLOGY

## 2022-10-28 PROCEDURE — 74011250636 HC RX REV CODE- 250/636: Performed by: ANESTHESIOLOGY

## 2022-10-28 PROCEDURE — 74011000250 HC RX REV CODE- 250: Performed by: OBSTETRICS & GYNECOLOGY

## 2022-10-28 PROCEDURE — 88307 TISSUE EXAM BY PATHOLOGIST: CPT

## 2022-10-28 PROCEDURE — 76010000149 HC OR TIME 1 TO 1.5 HR: Performed by: OBSTETRICS & GYNECOLOGY

## 2022-10-28 RX ORDER — MIDAZOLAM HYDROCHLORIDE 1 MG/ML
1 INJECTION, SOLUTION INTRAMUSCULAR; INTRAVENOUS
Status: DISCONTINUED | OUTPATIENT
Start: 2022-10-28 | End: 2022-10-28 | Stop reason: HOSPADM

## 2022-10-28 RX ORDER — DIPHENHYDRAMINE HYDROCHLORIDE 50 MG/ML
12.5 INJECTION, SOLUTION INTRAMUSCULAR; INTRAVENOUS AS NEEDED
Status: DISCONTINUED | OUTPATIENT
Start: 2022-10-28 | End: 2022-10-28 | Stop reason: HOSPADM

## 2022-10-28 RX ORDER — SODIUM CHLORIDE 0.9 % (FLUSH) 0.9 %
5-40 SYRINGE (ML) INJECTION AS NEEDED
Status: DISCONTINUED | OUTPATIENT
Start: 2022-10-28 | End: 2022-10-28 | Stop reason: HOSPADM

## 2022-10-28 RX ORDER — FENTANYL CITRATE 50 UG/ML
25 INJECTION, SOLUTION INTRAMUSCULAR; INTRAVENOUS
Status: DISCONTINUED | OUTPATIENT
Start: 2022-10-28 | End: 2022-10-28 | Stop reason: HOSPADM

## 2022-10-28 RX ORDER — SODIUM CHLORIDE, SODIUM LACTATE, POTASSIUM CHLORIDE, CALCIUM CHLORIDE 600; 310; 30; 20 MG/100ML; MG/100ML; MG/100ML; MG/100ML
125 INJECTION, SOLUTION INTRAVENOUS CONTINUOUS
Status: DISCONTINUED | OUTPATIENT
Start: 2022-10-28 | End: 2022-10-28 | Stop reason: HOSPADM

## 2022-10-28 RX ORDER — MORPHINE SULFATE 2 MG/ML
2 INJECTION, SOLUTION INTRAMUSCULAR; INTRAVENOUS
Status: DISCONTINUED | OUTPATIENT
Start: 2022-10-28 | End: 2022-10-28 | Stop reason: HOSPADM

## 2022-10-28 RX ORDER — OXYCODONE HYDROCHLORIDE 5 MG/1
5 TABLET ORAL AS NEEDED
Status: DISCONTINUED | OUTPATIENT
Start: 2022-10-28 | End: 2022-10-28 | Stop reason: HOSPADM

## 2022-10-28 RX ORDER — LIDOCAINE HYDROCHLORIDE 10 MG/ML
0.5 INJECTION, SOLUTION EPIDURAL; INFILTRATION; INTRACAUDAL; PERINEURAL AS NEEDED
Status: DISCONTINUED | OUTPATIENT
Start: 2022-10-28 | End: 2022-10-28 | Stop reason: HOSPADM

## 2022-10-28 RX ORDER — DEXAMETHASONE SODIUM PHOSPHATE 4 MG/ML
INJECTION, SOLUTION INTRA-ARTICULAR; INTRALESIONAL; INTRAMUSCULAR; INTRAVENOUS; SOFT TISSUE AS NEEDED
Status: DISCONTINUED | OUTPATIENT
Start: 2022-10-28 | End: 2022-10-28 | Stop reason: HOSPADM

## 2022-10-28 RX ORDER — ROPIVACAINE HYDROCHLORIDE 5 MG/ML
30 INJECTION, SOLUTION EPIDURAL; INFILTRATION; PERINEURAL AS NEEDED
Status: DISCONTINUED | OUTPATIENT
Start: 2022-10-28 | End: 2022-10-28 | Stop reason: HOSPADM

## 2022-10-28 RX ORDER — KETOROLAC TROMETHAMINE 30 MG/ML
INJECTION, SOLUTION INTRAMUSCULAR; INTRAVENOUS AS NEEDED
Status: DISCONTINUED | OUTPATIENT
Start: 2022-10-28 | End: 2022-10-28 | Stop reason: HOSPADM

## 2022-10-28 RX ORDER — EPHEDRINE SULFATE/0.9% NACL/PF 50 MG/5 ML
SYRINGE (ML) INTRAVENOUS AS NEEDED
Status: DISCONTINUED | OUTPATIENT
Start: 2022-10-28 | End: 2022-10-28 | Stop reason: HOSPADM

## 2022-10-28 RX ORDER — ACETAMINOPHEN 325 MG/1
650 TABLET ORAL ONCE
Status: COMPLETED | OUTPATIENT
Start: 2022-10-28 | End: 2022-10-28

## 2022-10-28 RX ORDER — ONDANSETRON 2 MG/ML
INJECTION INTRAMUSCULAR; INTRAVENOUS AS NEEDED
Status: DISCONTINUED | OUTPATIENT
Start: 2022-10-28 | End: 2022-10-28 | Stop reason: HOSPADM

## 2022-10-28 RX ORDER — MIDAZOLAM HYDROCHLORIDE 1 MG/ML
1 INJECTION, SOLUTION INTRAMUSCULAR; INTRAVENOUS AS NEEDED
Status: DISCONTINUED | OUTPATIENT
Start: 2022-10-28 | End: 2022-10-28 | Stop reason: HOSPADM

## 2022-10-28 RX ORDER — PHENYLEPHRINE HCL IN 0.9% NACL 0.4MG/10ML
SYRINGE (ML) INTRAVENOUS AS NEEDED
Status: DISCONTINUED | OUTPATIENT
Start: 2022-10-28 | End: 2022-10-28 | Stop reason: HOSPADM

## 2022-10-28 RX ORDER — SODIUM CHLORIDE 0.9 % (FLUSH) 0.9 %
5-40 SYRINGE (ML) INJECTION EVERY 8 HOURS
Status: DISCONTINUED | OUTPATIENT
Start: 2022-10-28 | End: 2022-10-28 | Stop reason: HOSPADM

## 2022-10-28 RX ORDER — GLYCOPYRROLATE 0.2 MG/ML
INJECTION INTRAMUSCULAR; INTRAVENOUS AS NEEDED
Status: DISCONTINUED | OUTPATIENT
Start: 2022-10-28 | End: 2022-10-28 | Stop reason: HOSPADM

## 2022-10-28 RX ORDER — FENTANYL CITRATE 50 UG/ML
50 INJECTION, SOLUTION INTRAMUSCULAR; INTRAVENOUS AS NEEDED
Status: DISCONTINUED | OUTPATIENT
Start: 2022-10-28 | End: 2022-10-28 | Stop reason: HOSPADM

## 2022-10-28 RX ORDER — FENTANYL CITRATE 50 UG/ML
INJECTION, SOLUTION INTRAMUSCULAR; INTRAVENOUS AS NEEDED
Status: DISCONTINUED | OUTPATIENT
Start: 2022-10-28 | End: 2022-10-28 | Stop reason: HOSPADM

## 2022-10-28 RX ORDER — PROPOFOL 10 MG/ML
INJECTION, EMULSION INTRAVENOUS AS NEEDED
Status: DISCONTINUED | OUTPATIENT
Start: 2022-10-28 | End: 2022-10-28 | Stop reason: HOSPADM

## 2022-10-28 RX ORDER — TRAMADOL HYDROCHLORIDE 50 MG/1
50 TABLET ORAL
Qty: 15 TABLET | Refills: 0 | Status: SHIPPED | OUTPATIENT
Start: 2022-10-28 | End: 2022-11-04

## 2022-10-28 RX ORDER — ONDANSETRON 2 MG/ML
4 INJECTION INTRAMUSCULAR; INTRAVENOUS AS NEEDED
Status: DISCONTINUED | OUTPATIENT
Start: 2022-10-28 | End: 2022-10-28 | Stop reason: HOSPADM

## 2022-10-28 RX ORDER — DEXTROSE, SODIUM CHLORIDE, SODIUM LACTATE, POTASSIUM CHLORIDE, AND CALCIUM CHLORIDE 5; .6; .31; .03; .02 G/100ML; G/100ML; G/100ML; G/100ML; G/100ML
125 INJECTION, SOLUTION INTRAVENOUS CONTINUOUS
Status: DISCONTINUED | OUTPATIENT
Start: 2022-10-28 | End: 2022-10-28 | Stop reason: HOSPADM

## 2022-10-28 RX ORDER — LIDOCAINE HYDROCHLORIDE 20 MG/ML
INJECTION, SOLUTION INFILTRATION; PERINEURAL AS NEEDED
Status: DISCONTINUED | OUTPATIENT
Start: 2022-10-28 | End: 2022-10-28 | Stop reason: HOSPADM

## 2022-10-28 RX ORDER — LIDOCAINE HYDROCHLORIDE 20 MG/ML
INJECTION, SOLUTION EPIDURAL; INFILTRATION; INTRACAUDAL; PERINEURAL AS NEEDED
Status: DISCONTINUED | OUTPATIENT
Start: 2022-10-28 | End: 2022-10-28 | Stop reason: HOSPADM

## 2022-10-28 RX ORDER — MIDAZOLAM HYDROCHLORIDE 1 MG/ML
INJECTION, SOLUTION INTRAMUSCULAR; INTRAVENOUS AS NEEDED
Status: DISCONTINUED | OUTPATIENT
Start: 2022-10-28 | End: 2022-10-28 | Stop reason: HOSPADM

## 2022-10-28 RX ADMIN — FENTANYL CITRATE 25 MCG: 50 INJECTION, SOLUTION INTRAMUSCULAR; INTRAVENOUS at 08:55

## 2022-10-28 RX ADMIN — FENTANYL CITRATE 25 MCG: 50 INJECTION, SOLUTION INTRAMUSCULAR; INTRAVENOUS at 09:12

## 2022-10-28 RX ADMIN — LIDOCAINE HYDROCHLORIDE 80 MG: 20 INJECTION, SOLUTION EPIDURAL; INFILTRATION; INTRACAUDAL; PERINEURAL at 07:27

## 2022-10-28 RX ADMIN — PROPOFOL 200 MG: 10 INJECTION, EMULSION INTRAVENOUS at 07:27

## 2022-10-28 RX ADMIN — OXYCODONE 5 MG: 5 TABLET ORAL at 09:33

## 2022-10-28 RX ADMIN — FENTANYL CITRATE 25 MCG: 50 INJECTION, SOLUTION INTRAMUSCULAR; INTRAVENOUS at 07:48

## 2022-10-28 RX ADMIN — KETOROLAC TROMETHAMINE 15 MG: 30 INJECTION, SOLUTION INTRAMUSCULAR; INTRAVENOUS at 08:17

## 2022-10-28 RX ADMIN — SODIUM CHLORIDE, POTASSIUM CHLORIDE, SODIUM LACTATE AND CALCIUM CHLORIDE 125 ML/HR: 600; 310; 30; 20 INJECTION, SOLUTION INTRAVENOUS at 06:58

## 2022-10-28 RX ADMIN — Medication 80 MCG: at 08:11

## 2022-10-28 RX ADMIN — ACETAMINOPHEN 650 MG: 325 TABLET ORAL at 07:01

## 2022-10-28 RX ADMIN — WATER 2 G: 1 INJECTION INTRAMUSCULAR; INTRAVENOUS; SUBCUTANEOUS at 07:43

## 2022-10-28 RX ADMIN — GLYCOPYRROLATE 0.1 MG: 0.2 INJECTION INTRAMUSCULAR; INTRAVENOUS at 07:53

## 2022-10-28 RX ADMIN — DEXAMETHASONE SODIUM PHOSPHATE 4 MG: 4 INJECTION, SOLUTION INTRAMUSCULAR; INTRAVENOUS at 07:44

## 2022-10-28 RX ADMIN — ONDANSETRON HYDROCHLORIDE 4 MG: 2 INJECTION, SOLUTION INTRAMUSCULAR; INTRAVENOUS at 07:44

## 2022-10-28 RX ADMIN — MIDAZOLAM 2 MG: 1 INJECTION INTRAMUSCULAR; INTRAVENOUS at 07:20

## 2022-10-28 RX ADMIN — PROPOFOL 50 MG: 10 INJECTION, EMULSION INTRAVENOUS at 07:29

## 2022-10-28 RX ADMIN — Medication 5 MG: at 07:54

## 2022-10-28 RX ADMIN — FENTANYL CITRATE 25 MCG: 50 INJECTION, SOLUTION INTRAMUSCULAR; INTRAVENOUS at 07:49

## 2022-10-28 NOTE — ANESTHESIA PREPROCEDURE EVALUATION
Relevant Problems   NEUROLOGY   (+) Generalized anxiety disorder      CARDIOVASCULAR   (+) Mitral regurgitation       Anesthetic History   No history of anesthetic complications            Review of Systems / Medical History  Patient summary reviewed, nursing notes reviewed and pertinent labs reviewed    Pulmonary  Within defined limits                 Neuro/Psych         Psychiatric history     Cardiovascular  Within defined limits                     GI/Hepatic/Renal  Within defined limits              Endo/Other  Within defined limits    Hypothyroidism       Other Findings              Physical Exam    Airway  Mallampati: II  TM Distance: > 6 cm  Neck ROM: normal range of motion   Mouth opening: Normal     Cardiovascular  Regular rate and rhythm,  S1 and S2 normal,  no murmur, click, rub, or gallop             Dental  No notable dental hx       Pulmonary  Breath sounds clear to auscultation               Abdominal  GI exam deferred       Other Findings            Anesthetic Plan    ASA: 2  Anesthesia type: general            Anesthetic plan and risks discussed with: Patient

## 2022-10-28 NOTE — ANESTHESIA POSTPROCEDURE EVALUATION
Procedure(s):  COLD KNIFE CONIZATION. general    Anesthesia Post Evaluation        Patient location during evaluation: PACU  Patient participation: complete - patient participated  Level of consciousness: awake and alert  Pain management: adequate  Airway patency: patent  Anesthetic complications: no  Cardiovascular status: acceptable  Respiratory status: acceptable  Hydration status: acceptable  Comments: I have seen and evaluated the patient and is ready for discharge. Fabián Gaines MD    Post anesthesia nausea and vomiting:  none      INITIAL Post-op Vital signs:   Vitals Value Taken Time   /103 10/28/22 0845   Temp 36.5 °C (97.7 °F) 10/28/22 0828   Pulse 77 10/28/22 0846   Resp 13 10/28/22 0846   SpO2 99 % 10/28/22 0846   Vitals shown include unvalidated device data.

## 2022-10-28 NOTE — H&P
[facility] [address], [address]  P [phone]  F [phone]    Office Note  Patient ID:  Name:  Olimpia Huston  MRN:  [id number]  :  1960/62 y.o. Date:  10/28/2022      HISTORY OF PRESENT ILLNESS:  Ms. Olimpia Huston is a 60 y.o. female who presents as a new patient from Dr. Rubi Mata for cervical AIS. Pap smear was normal, but HR HPV+, 18/45. Colposcopy with ECC demonstrated AIS. Referred to our office for further discussion and management. Denies vaginal bleeding or discharge. Denies all other complaints. Denies prior abnormal pap smears. Pathology Review:   2022:  * * *FINAL PATHOLOGIC DIAGNOSIS* * *   Endocervical curettage:        Adenocarcinoma in situ (at least), see comment. * * *Comment* * *   The nuclear and architectural features are compatible with at least   adenocarcinoma in situ. The tissue is fragmented and limited, precluding   further evaluation to exclude an invasive tumor. Immunohistochemical   stains (p16, p53) were performed on block 1A with appropriately reactive   controls. The p16 is diffusely positive with scattered p53 positivity. While this favors endocervical origin, correlation with clinical and   radiographic impression is suggested to definitively characterize the   primary site of this malignancy. Clinical correlation is recommended. This case was shown to one or more pathologists for consultation, and they   concur with the diagnosis. ROS:  A comprehensive review of systems was negative except for that written in the History of Present Illness.  , 10 point ROS      ECOG stGstrstastdstest:st st1st Problem List:  Patient Active Problem List    Diagnosis Date Noted    Adenocarcinoma in situ (AIS) of uterine cervix 2022    Bruit of left carotid artery 10/01/2018    H/O colonoscopy 2018    Mitral regurgitation 2018    Vitamin D insufficiency 2018    Hashimoto's disease 2017    Celiac disease 2017    Generalized anxiety disorder 2017    Osteoporosis 2017     PMH:  Past Medical History:   Diagnosis Date    Arrhythmia     MURMUR    Celiac disease     SY (generalized anxiety disorder)     Hashimoto's disease     Mitral regurgitation     Osteoporosis       PSH:  Past Surgical History:   Procedure Laterality Date    COLONOSCOPY N/A 2019    COLONOSCOPY WTIH EMR performed by Bob Melendez MD at [address] [place] [address] ENDOSCOPY    COLONOSCOPY N/A 2019    COLONOSCOPY performed by Bob Melendez MD at [address] [place] [address] ENDOSCOPY    HX CATARACT REMOVAL      HX CERVICAL POLYPECTOMY  2018    removed - Dr. Ríos Dura    HX GYN  2001    tubal ligation    HX ORTHOPAEDIC Right 2021    ROTATOR CUFF REPAIR    HX OTHER SURGICAL  2017    Left Inguinal Node Biopsy      Social History:  Social History     Tobacco Use    Smoking status: Former     Packs/day: 1.00     Years: 28.00     Pack years: 28.00     Types: Cigarettes     Quit date:      Years since quittin.8    Smokeless tobacco: Never   Substance Use Topics    Alcohol use: No      Family History:  Family History   Problem Relation Age of Onset    OSTEOARTHRITIS Mother     Dementia Mother     Cancer Father         gleoblastoma- brain    Breast Cancer Sister 58    Thyroid Disease Sister     Lupus Sister     Lupus Sister     Thyroid Disease Sister     Thyroid Disease Sister     No Known Problems Sister     Dementia Brother     No Known Problems Brother     Breast Cancer Maternal Grandmother     Diabetes Maternal Grandmother     Cancer Maternal Grandfather         liver    No Known Problems Paternal Grandmother     No Known Problems Paternal Grandfather     Other Daughter         PCOS    Anesth Problems Neg Hx       Medications: (reviewed)  Current Facility-Administered Medications   Medication Dose Route Frequency    lactated Ringers infusion  125 mL/hr IntraVENous CONTINUOUS    dextrose 5% lactated ringers infusion  125 mL/hr IntraVENous CONTINUOUS    sodium chloride (NS) flush 5-40 mL  5-40 mL IntraVENous Q8H    sodium chloride (NS) flush 5-40 mL  5-40 mL IntraVENous PRN    lidocaine (PF) (XYLOCAINE) 10 mg/mL (1 %) injection 0.5 mL  0.5 mL SubCUTAneous PRN    fentaNYL citrate (PF) injection 50 mcg  50 mcg IntraVENous PRN    midazolam (VERSED) injection 1 mg  1 mg IntraVENous PRN    midazolam (VERSED) injection 1 mg  1 mg IntraVENous PRN    acetaminophen (TYLENOL) tablet 650 mg  650 mg Oral ONCE    ropivacaine (PF) (NAROPIN) 5 mg/mL (0.5 %) injection 30 mL  30 mL Peripheral Nerve Block PRN     Allergies: (reviewed)  Allergies   Allergen Reactions    Penicillins Hives    Clindamycin Hives    Wheat Unknown (comments)     Barely, rye (Celiac)          OBJECTIVE:    Physical Exam:  VITAL SIGNS: Vitals:    10/28/22 0648   BP: 114/66   Pulse: 66   Resp: 18   Temp: 98.5 °F (36.9 °C)   SpO2: 100%   Weight: 121 lb 9.6 oz (55.2 kg)   Height: 5' (1.524 m)     Body mass index is 23.75 kg/m². GENERAL DARCY: Conversant, alert, oriented. No acute distress. HEENT: HEENT. No thyroid enlargement. No JVD. Neck: Supple without restrictions. RESPIRATORY: Clear to auscultation and percussion to the bases. No CVAT. CARDIOVASC: RRR without murmur/rub. GASTROINT: soft, non-tender, without masses or organomegaly   MUSCULOSKEL: no joint tenderness, deformity or swelling       EXTREMITIES: extremities normal, atraumatic, no cyanosis or edema   PELVIC: Exam chaperoned by nurse. Normal appearing external genitalia. On speculum exam, normal appearing vagina and cervix. On bimanual exam, the cervix and uterus are normal size and mobile. No evidence of adnexal masses or nodularity. RECTAL: deferred   PATY SURVEY: No suspicious lymphadenopathy or edema noted. NEURO: Grossly intact. No acute deficit.        Lab Date as available:    Lab Results   Component Value Date/Time    WBC 5.2 10/21/2022 08:13 AM    HGB 15.0 10/21/2022 08:13 AM    HCT 46.0 10/21/2022 08:13 AM PLATELET 482 90/74/0707 08:13 AM    MCV 95.2 10/21/2022 08:13 AM     Lab Results   Component Value Date/Time    Sodium 141 10/21/2022 08:13 AM    Potassium 4.3 10/21/2022 08:13 AM    Chloride 106 10/21/2022 08:13 AM    CO2 29 10/21/2022 08:13 AM    Anion gap 6 10/21/2022 08:13 AM    Glucose 95 10/21/2022 08:13 AM    BUN 16 10/21/2022 08:13 AM    Creatinine 0.78 10/21/2022 08:13 AM    BUN/Creatinine ratio 21 (H) 10/21/2022 08:13 AM    GFR est  01/18/2018 08:25 AM    GFR est non-AA 88 01/18/2018 08:25 AM    Calcium 9.7 10/21/2022 08:13 AM         IMPRESSION/PLAN:    Ms. Klaus Schulz is a 60 y.o. female who presents with cervical AIS    Problems:     Patient Active Problem List    Diagnosis Date Noted    Adenocarcinoma in situ (AIS) of uterine cervix 09/21/2022    Bruit of left carotid artery 10/01/2018    H/O colonoscopy 01/29/2018    Mitral regurgitation 01/29/2018    Vitamin D insufficiency 01/29/2018    Hashimoto's disease 12/01/2017    Celiac disease 12/01/2017    Generalized anxiety disorder 12/01/2017    Osteoporosis 12/01/2017       I reviewed Ms. Wily Roberts's course to date, including her medical records, recent studies, physical exam, and review of symptoms. I discussed the natural history of cervical dysplasia/AIS, including the role of HPV. Counseled patient regarding ASCCP recommendations for AIS. I have recommended CKC/ECC for both diagnostic and therapeutic purposes. This will help to rule out any micro-invasive disease. Given the patient's age, if her CKC is negative for malignancy, then I have recommended a simple TLH/BSO after she recovers from her CKC. If she has a malignancy present, then we will need to determine based on size of the lesion whether she requires a simple or radical hysterectomy. I briefly discussed these two procedures, but as stated above she will require CKC/ECC initially. Will post for surgery at the patient's convenience.  Reviewed risks, benefits, indications, and alternatives of surgery. Will collect bhcg, CBCD, CMP, CXR, and EKG prior to surgery. All questions and concerns were addressed with the patient and she is comfortable with the plan. Defined Sensitive Document    >50% of total time allocated to visit dedicated to counseling, 50 minutes total.    Signed By: Lupe Johnson MD     10/28/2022/7:39 AM       Date of Surgery Update:  Perlita Pro was seen and examined. History and physical has been reviewed. The patient has been examined.  There have been no significant clinical changes since the completion of the originally dated History and Physical.    Signed By: Lupe Johnson MD     October 28, 2022 6:58 AM

## 2022-10-28 NOTE — OP NOTES
Gynecologic Oncology Operative Report    Anshu Espinal  10/28/2022    Pre-operative dx:  1) Cervical AIS    Post-operative dx:  1) Cervical AIS    Procedure:   Cold knife conization of cervix, Endocervical curettage    Surgeon:  Shira Vazquez MD    Assistant:  n/a     Anesthesia:  LMA    EBL:  minimal    Complications:  none    Implants: none    Specimens:  cervical cone biopsy, endocervical curettage    Operative indications:  60 y.o. female with cervical AIS     Operative findings:  Normal appearing vaginal and cervix. Cervix and uterus are normal size and mobile. Operative reportl:  After the risks, benefits, indications, and alternatives of the procedure were discussed with the patient and informed consent was obtained, the patient was taken to the operating room. She was positively identified, administered general anesthesia, and then placed in the dorsal lithotomy position in [address] [place] [address]. An exam under anesthesia was performed. She was then prepped and draped in the usual fashion. The bladder was drained with a red rubber catheter. A weighted speculum was then placed in the posterior vagina and a hand held-Moris retractor was placed in the anterior vagina in order to visualize the cervix. The anterior lip of the cervix was then grasped with a single tooth tenaculum for manipulation. Lugol's solution was used to identify the margins of the transition zone, although unable to visualize as Lugol's did not stain well on any surface. A figure-of-eight suture of 0 vicryl was then placed on each side of the cervix to control bleeding and to use for manipulation of the cervix. The single tooth tenaculum was then removed. A #11 blade scalpel was then used to incise the cervix circumferentially to a depth of 1cm. 2-0 silk sutures were then placed for retraction on the stroma of the cervical cone specimen at 12 and 6 o'clock. The 12 o'clock suture was tied down to for orientation purposes.  This incision was carried deeper into the cervix the scalpel and then further with the curved Duarte scissors. Once an adequate specimen was achieved, the specimen was transected at the base with the scissors and the specimen was sent for pathology. An endocervical curettage was then performed above the level of the conization. The bed of the conization was then made hemostatic with electrocautery. Three mattress sutures were placed along the posterior lip of the cervix with 0-Vicryl to aid hemostasis. A piece of Surgicel soaked in Monsel's was then placed in the cone bed. The lateral stay sutures were then tied together in the middle to secure the Surgicel in place. All instruments were then removed from the vagina after excellent hemostasis confirmed. The patient was taken out of stirrups, awakened from anesthesia, and taken to the recovery room in stable condition. All instrument, sponge, and needle counts were correct.         Joao Joseph MD  10/28/2022  9:07 AM

## 2022-10-28 NOTE — PERIOP NOTES
Patient: Sarah Coronel MRN: [id number]  SSN: [id number]   YOB: 1960  Age: 60 y.o. Sex: female     Patient is status post Procedure(s):  COLD KNIFE CONIZATION. Surgeon(s) and Role:     * Emily Yarbrough MD - Primary                      Peripheral IV 10/28/22 Right Antecubital (Active)   Site Assessment Clean, dry, & intact 10/28/22 0657   Phlebitis Assessment 0 10/28/22 0657   Infiltration Assessment 0 10/28/22 0657   Dressing Status Clean, dry, & intact; New 10/28/22 0657   Dressing Type Transparent;Tape 10/28/22 0657   Hub Color/Line Status Pink 10/28/22 0657            Airway - Endotracheal Tube 10/28/22 Oral (Active)                   Dressing/Packing:  Incision 10/28/22 Vagina-Dressing/Treatment: Mitcheal Amble (10/28/22 0805)

## 2022-10-28 NOTE — PERIOP NOTES
1,000 mL of 0.9% Normal Saline added to sterile field for PRN irrigation throughout procedure. Surgicel added to sterile field for topical application by MD. Lot #: [id number]. Exp. Date: 03-.

## 2022-10-28 NOTE — DISCHARGE INSTRUCTIONS
[address] [facility] [address], [address], [address] [facility]  P [phone]  F [phone]       [facility]      Dear Ms. Sree Jacobs,    You had a pain pill at 9:30am      Please review your instructions with your nurse and ask any questions so you have all the information you need to recover well at home. If you do not feel you have everything you need to succeed and be safe after you leave the hospital, please discuss these concerns with your nurse. As always, call for any questions at home. Your doctor: Dominique Maldonado MD   Diagnosis: CERVICAL AIS  Procedure: Procedure(s):  COLD KNIFE CONIZATION  Date of Discharge: 10/28/22       Take Home Medications     See Discharge Medication Review provided to you by your nurse. If you did not receive one, request this prior to your discharge. It is important that you take your medications as they are prescribed. Your prescriptions are sent to your pharmacy on record. Keep your medications in the bottles provided by the pharmacist and keep a list of the medication names, dosages, times to be taken and what they are for in your wallet. Do not take other medications without consulting your doctor. You may take acetaminophen (Tylenol) and ibuprofen (Advil) for pain. If this is not sufficient for your pain, take tramadol or other pain medication you were provided. You should take a daily gentle stool softener such as a colace pill or dulcolax suppository for constipation as this is not uncommon after surgery and/or while on pain medication. If not prescribed, this can be found over the counter. If constipation persists for >24 hours you should take a dose of Milk of Magnesia. Call if your constipation continues. Diet    Stay hydrated and drink fluids such as gatorade and water. This will also help prevent constipation and dehydration.  Limit somewhat any usual caffeine intake of beverages such as soda, tea and coffee as this may serve to dehydrate you. For the first 2-3 days keep a low fiber diet avoiding raw vegetables or fruits with skin. A diet consisting of soup, cereal, yogurt, eggs, fish, Boost/Ensure. Avoid fatty/greasy foods. If nauseated, keep your diet limited to liquids and call if this persists. Activity    If possible, have someone with you at all times until you feel stable. Gradually increase your activity each day. There are generally no restrictions on walking, climbing stairs or riding in a car. Walk at least 4 times per day. Walking will help reduce the risk of blood clots and constipation. Showers are okay. If you have an incision, no tub bathing/swimming for two weeks. Causes For Concern    If any of the following occur, please call our office and speak with the Nurse/aid who will help you with your problem or ask the doctor to call you. Problems with the incision, including increasing pain, swelling, redness or drainage. Inability to pass urine   Increasing abdominal pain despite medication  Persisting nausea or vomiting. Fever or chills and a temperature >101F  Constipation (no bowel movement for three days). Diarrhea (more than three watery stools within 24 hours). Excessive vaginal or wound bleeding. If after hours and you cannot reach an on-call physician, call [phone]. Follow-Up    Call [phone] to schedule the following appointments with Dr. Barry Escobedo:  Telephone virtual-visit in 10-14 days to discuss your pathology results. In-office visit for your postoperative exam in 6 weeks from surgery. Information obtained by :  I understand that if any problems occur once I am at home I am to contact my physician. I understand and acknowledge receipt of the instructions indicated above. Physician's or R.N.'s Signature                                                                  Date/Time                                                                                                                                              Patient or Representative Signature                                                          Date/Time    ______________________________________________________________________    Anesthesia Discharge Instructions    After general anesthesia or intervenous sedation, for 24 hours or while taking prescription Narcotics:  Limit your activities  Do not drive or operate hazardous machinery  If you have not urinated within 8 hours after discharge, please contact your surgeon on call. Do not make important personal or business decisions  Do not drink alcoholic beverages    Report the following to your surgeon:  Excessive pain, swelling, redness or odor of or around the surgical area  Temperature over 100.5 degrees  Nausea and vomiting lasting longer than 4 hours or if unable to take medication  Any signs of decreased circulation or nerve impairment to extremity:  Change in color, persistent numbness, tingling, coldness or increased pain. Any questions          Cone Biopsy: What to Expect at Home (generic instructions)  Your Recovery  After your surgery, it is normal to feel tired for a couple days. You may have some pain or cramps in your lower belly for several days. Usually over-the-counter pain medicines, such as ibuprofen, are enough to help with the pain. After a cone biopsy, you will probably be able to go back to work or your normal routine in about 1 or 2 days. This care sheet gives you a general idea about how long it will take for you to recover. But each person recovers at a different pace. Follow the steps below to get better as quickly as possible. How can you care for yourself at home? Activity    Rest when you feel tired.  Getting enough sleep will help you recover. Try to walk each day. Walking boosts blood flow and helps prevent pneumonia and constipation. You may shower 24 to 48 hours after surgery, if your doctor okays it. Do not take a bath for the first 2 weeks, or until your doctor tells you it is okay. You will have some light vaginal bleeding or discharge. This usually lasts for up to a week or two after surgery. Wear sanitary pads if needed. Do not douche or use tampons. You will probably need to take 1 or 2 days off work. It depends on the type of work you do and how you feel. Do not have sex or place anything in your vagina for 4 to 6 weeks after surgery, or until your doctor tells you it is okay. Diet    You can eat your normal diet. If your stomach is upset, try bland, low-fat foods like plain rice, broiled chicken, toast, and yogurt. Drink plenty of fluids (unless your doctor tells you not to). You may notice that your bowel movements are not regular right after your surgery. This is common. Try to avoid constipation and straining with bowel movements. You may want to take a fiber supplement every day. If you have not had a bowel movement after a couple of days, ask your doctor about taking a mild laxative. Medicines    Your doctor will tell you if and when you can restart your medicines. You will also be given instructions about taking any new medicines. If you stopped taking aspirin or some other blood thinner, your doctor will tell you when to start taking it again. Take pain medicines exactly as directed. If the doctor gave you a prescription medicine for pain, take it as prescribed. If you are not taking a prescription pain medicine, ask your doctor if you can take an over-the-counter medicine. If you think your pain medicine is making you sick to your stomach: Take your medicine after meals (unless your doctor tells you not to). Ask your doctor for a different pain medicine.      If your doctor prescribed antibiotics, take them as directed. Do not stop taking them just because you feel better. You need to take the full course of antibiotics. Other instructions    If you have cramps after your surgery, try placing a hot water bottle or heating pad on your lower belly. Follow-up care is a key part of your treatment and safety. Be sure to make and go to all appointments, and call your doctor if you are having problems. It's also a good idea to know your test results and keep a list of the medicines you take. When should you call for help? Call 911 anytime you think you may need emergency care. For example, call if:    You passed out (lost consciousness). You have chest pain, are short of breath, or cough up blood. Call your doctor now or seek immediate medical care if:    You have bright red vaginal bleeding that soaks one or more pads in an hour, or you have large clots. You have vaginal discharge that has increased in amount or smells bad. You are sick to your stomach or cannot drink fluids. You have pain that does not get better after you take pain medicine. You have signs of infection, such as: Increased pain, swelling, warmth, or redness. A fever. You have signs of a blood clot in your leg (called a deep vein thrombosis), such as:  Pain in your calf, back of the knee, thigh, or groin. Redness or swelling in your leg or groin. You cannot pass stools or gas. Watch closely for changes in your health, and be sure to contact your doctor if you have any problems. Where can you learn more? Go to http://www.gray.com/  Enter I299 in the search box to learn more about \"Cone Biopsy: What to Expect at Home. \"  Current as of: November 22, 2021               Content Version: 13.4  © 6340-9600 [employer].    Care instructions adapted under license by [employer] (which disclaims liability or warranty for this information). If you have questions about a medical condition or this instruction, always ask your healthcare professional. [place] [address] any warranty or liability for your use of this information.

## 2022-11-08 NOTE — PROGRESS NOTES
Virtual Post op Visit to discuss pathology report, surgery was on 10/28/22    1. Have you been to the ER, urgent care clinic since your last visit? Hospitalized since your last visit? Yes, surgery with Dr. Stephanie Sepulveda on 10/28/22    2. Have you seen or consulted any other health care providers outside of the [address] [place] [address] since your last visit? Include any pap smears or colon screening.    no

## 2022-11-09 ENCOUNTER — VIRTUAL VISIT (OUTPATIENT)
Dept: GYNECOLOGY | Age: 62
End: 2022-11-09
Payer: COMMERCIAL

## 2022-11-09 DIAGNOSIS — D06.9 ADENOCARCINOMA IN SITU (AIS) OF UTERINE CERVIX: Primary | ICD-10-CM

## 2022-11-09 PROCEDURE — 99024 POSTOP FOLLOW-UP VISIT: CPT | Performed by: OBSTETRICS & GYNECOLOGY

## 2022-11-10 NOTE — H&P (VIEW-ONLY)
[facility]  [address] [place] [address], [address]  P [phone]  F [phone]    Office Note  Patient ID:  Name:  Sarah Coronel  MRN:  [id number]  :  1960/62 y.o. Date:  11/10/2022      HISTORY OF PRESENT ILLNESS:  Ms. Sarah Coronel is a 60 y.o. female who presented with cervical AIS. On 10/28/2022 underwent Cold knife conization of cervix, Endocervical curettage. Final pathology consistent with AIS, but with positive margins. Presents today via virtual visit to discuss pathology. Doing well since surgery. Initial History:  Ms. Sarah Coronel is a 60 y.o. female who presents as a new patient from Dr. Mckinley De Leon for cervical AIS. Pap smear was normal, but HR HPV+, 18/45. Colposcopy with ECC demonstrated AIS. Referred to our office for further discussion and management. Denies vaginal bleeding or discharge. Denies all other complaints. Denies prior abnormal pap smears. Pathology Review:   10/28/2022:  1. Cervix, conization:        Adenocarcinoma in situ, present at the inked tissue edge (see   Comment). 2. Endocervix, curettage:        Scant atypical endocervical glands, suspicious for at least   adenocarcinoma in situ (see Comment).   >>>>>  * * *Comment* * *   <<<<<  Focal adenocarcinoma in situ is present and is seen at the green and blue   inked anterior and posterior deep endocervical margins. Immunohistochemical staining for p16 shows diffuse and strong focal   staining of endocervical glands. The findings are suspicious for at least   adenocarcinoma in situ. Control stains appropriately. 2022:  * * *FINAL PATHOLOGIC DIAGNOSIS* * *   Endocervical curettage:        Adenocarcinoma in situ (at least), see comment. * * *Comment* * *   The nuclear and architectural features are compatible with at least   adenocarcinoma in situ. The tissue is fragmented and limited, precluding   further evaluation to exclude an invasive tumor. Immunohistochemical   stains (p16, p53) were performed on block 1A with appropriately reactive   controls. The p16 is diffusely positive with scattered p53 positivity. While this favors endocervical origin, correlation with clinical and   radiographic impression is suggested to definitively characterize the   primary site of this malignancy. Clinical correlation is recommended. This case was shown to one or more pathologists for consultation, and they   concur with the diagnosis. ROS:  A comprehensive review of systems was negative except for that written in the History of Present Illness.  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    Bruit of left carotid artery 10/01/2018    H/O colonoscopy 01/29/2018    Mitral regurgitation 01/29/2018    Vitamin D insufficiency 01/29/2018    Hashimoto's disease 12/01/2017    Celiac disease 12/01/2017    Generalized anxiety disorder 12/01/2017    Osteoporosis 12/01/2017     PMH:  Past Medical History:   Diagnosis Date    Arrhythmia     MURMUR    Celiac disease     SY (generalized anxiety disorder)     Hashimoto's disease     Mitral regurgitation     Osteoporosis       PSH:  Past Surgical History:   Procedure Laterality Date    COLONOSCOPY N/A 05/06/2019    COLONOSCOPY WTIH EMR performed by Nancy Colbert MD at [facility] ENDOSCOPY    COLONOSCOPY N/A 11/04/2019    COLONOSCOPY performed by Nancy Colbert MD at [facility] ENDOSCOPY    HX CATARACT REMOVAL      HX CERVICAL POLYPECTOMY  2018    removed - Dr. Guerda Granda    HX GYN  2001    tubal ligation    HX ORTHOPAEDIC Right 03/2021    ROTATOR CUFF REPAIR    HX OTHER SURGICAL  12/2017    Left Inguinal Node Biopsy      Social History:  Social History     Tobacco Use    Smoking status: Former     Packs/day: 1.00     Years: 28.00     Pack years: 28.00     Types: Cigarettes     Quit date: 2006     Years since quittin.8    Smokeless tobacco: Never   Substance Use Topics    Alcohol use: No      Family History:  Family History   Problem Relation Age of Onset    OSTEOARTHRITIS Mother     Dementia Mother     Cancer Father         gleoblastoma- brain    Breast Cancer Sister 58    Thyroid Disease Sister     Lupus Sister     Lupus Sister     Thyroid Disease Sister     Thyroid Disease Sister     No Known Problems Sister     Dementia Brother     No Known Problems Brother     Breast Cancer Maternal Grandmother     Diabetes Maternal Grandmother     Cancer Maternal Grandfather         liver    No Known Problems Paternal Grandmother     No Known Problems Paternal Grandfather     Other Daughter         PCOS    Anesth Problems Neg Hx       Medications: (reviewed)  Current Outpatient Medications   Medication Sig    Omega-3 Fatty Acids 60- mg cpDR Take  by mouth daily. ascorbic acid, vitamin C, (VITAMIN C) 500 mg tablet Take 500 mg by mouth daily. turmeric 400 mg cap Take  by mouth daily. grape seed extract (GRAPE SEED PO) Take  by mouth daily. vitamin P-W-M-lutein-minerals (OCUVITE) tablet 1 Tablet daily. aspirin 81 mg chewable tablet Take 81 mg by mouth daily. calcium carbonate (CALCIUM 600 PO) Take 600 mg by mouth daily. mv-min/iron/folic/calcium/vitK (WOMEN'S MULTIVITAMIN PO) Take  by mouth. SYNTHROID 25 mcg tablet Take 50 mcg by mouth Daily (before breakfast). No current facility-administered medications for this visit. Allergies: (reviewed)  Allergies   Allergen Reactions    Penicillins Hives    Clindamycin Hives    Wheat Unknown (comments)     wilton Thompson (Celiac)          OBJECTIVE:  *deferred today given video-conference visit for ongoing COVID-19 pandemic*   Physical Exam:  VITAL SIGNS: There were no vitals filed for this visit. There is no height or weight on file to calculate BMI. GENERAL DARCY: Conversant, alert, oriented. No acute distress. HEENT: HEENT. No thyroid enlargement. No JVD. Neck: Supple without restrictions. RESPIRATORY: Clear to auscultation and percussion to the bases. No CVAT. CARDIOVASC: RRR without murmur/rub. GASTROINT: soft, non-tender, without masses or organomegaly   MUSCULOSKEL: no joint tenderness, deformity or swelling       EXTREMITIES: extremities normal, atraumatic, no cyanosis or edema   PELVIC: Exam chaperoned by nurse. Normal appearing external genitalia. On speculum exam, normal appearing vagina and cervix. On bimanual exam, the cervix and uterus are normal size and mobile. No evidence of adnexal masses or nodularity. RECTAL: deferred   PATY SURVEY: No suspicious lymphadenopathy or edema noted. NEURO: Grossly intact. No acute deficit. Lab Date as available:    Lab Results   Component Value Date/Time    WBC 5.2 10/21/2022 08:13 AM    HGB 15.0 10/21/2022 08:13 AM    HCT 46.0 10/21/2022 08:13 AM    PLATELET 215 23/96/5058 08:13 AM    MCV 95.2 10/21/2022 08:13 AM     Lab Results   Component Value Date/Time    Sodium 141 10/21/2022 08:13 AM    Potassium 4.3 10/21/2022 08:13 AM    Chloride 106 10/21/2022 08:13 AM    CO2 29 10/21/2022 08:13 AM    Anion gap 6 10/21/2022 08:13 AM    Glucose 95 10/21/2022 08:13 AM    BUN 16 10/21/2022 08:13 AM    Creatinine 0.78 10/21/2022 08:13 AM    BUN/Creatinine ratio 21 (H) 10/21/2022 08:13 AM    GFR est  01/18/2018 08:25 AM    GFR est non-AA 88 01/18/2018 08:25 AM    Calcium 9.7 10/21/2022 08:13 AM         IMPRESSION/PLAN:    Ms. Maye Bello is a 60 y.o. female who presented with cervical AIS. On 10/28/2022 underwent Cold knife conization of cervix, Endocervical curettage. Final pathology consistent with AIS, but with positive margins.      Problems:     Patient Active Problem List    Diagnosis Date Noted    Adenocarcinoma in situ (AIS) of uterine cervix 09/21/2022    Bruit of left carotid artery 10/01/2018    H/O colonoscopy 01/29/2018    Mitral regurgitation 01/29/2018    Vitamin D insufficiency 01/29/2018    Hashimoto's disease 12/01/2017    Celiac disease 12/01/2017    Generalized anxiety disorder 12/01/2017    Osteoporosis 12/01/2017     Reviewed patient's course to date, including her surgical pathology consistent with AIS. Unfortunately she has a very small cervix and a larger conization is not feasible. I have discussed proceeding with a TLH/BSO for both diagnostic and therapeutic purpose. Will post for surgery in 4-6 weeks at the patient's convenience. Reviewed risks, benefits, indications, and alternatives of surgery. Do not need to repeat preop labs or CXR or EKG. All questions and concerns were addressed with the patient and she is comfortable with the plan. An electronic signature was used to authenticate this note. Francesca Sharif MD    Pursuant to the emergency declaration unde the [facility], [facility] waiver authority and the [employer] and [employer] Act, this Virtual Visit was conducted, with patient's consent, to reduce the patient's risk of exposure to COVID-19 and provide continuity of care for an established patient. Patient identification was verified at the start of the visit: Yes    Services were provided through a video synchronous discussion virtually to substitute for in-person clinic visit. Patient was at her individual home, while the provider was in his/her respective office. I spent at least 40 minutes with this established patient, and >50% of the time was spent counseling and/or coordinating care regarding pathology and management discussion.      Francesca Sharif MD

## 2022-11-10 NOTE — PROGRESS NOTES
[facility]  [address] [place] [address], [address], [address]  P [phone]  F [phone]    Office Note  Patient ID:  Name:  Klaus Schulz  MRN:  [id number]  :  1960/62 y.o. Date:  11/10/2022      HISTORY OF PRESENT ILLNESS:  Ms. Klaus Schulz is a 60 y.o. female who presented with cervical AIS. On 10/28/2022 underwent Cold knife conization of cervix, Endocervical curettage. Final pathology consistent with AIS, but with positive margins. Presents today via virtual visit to discuss pathology. Doing well since surgery. Initial History:  Ms. Klaus Schulz is a 60 y.o. female who presents as a new patient from Dr. Tone Brooks for cervical AIS. Pap smear was normal, but HR HPV+, 18/45. Colposcopy with ECC demonstrated AIS. Referred to our office for further discussion and management. Denies vaginal bleeding or discharge. Denies all other complaints. Denies prior abnormal pap smears. Pathology Review:   10/28/2022:  1. Cervix, conization:        Adenocarcinoma in situ, present at the inked tissue edge (see   Comment). 2. Endocervix, curettage:        Scant atypical endocervical glands, suspicious for at least   adenocarcinoma in situ (see Comment).   >>>>>  * * *Comment* * *   <<<<<  Focal adenocarcinoma in situ is present and is seen at the green and blue   inked anterior and posterior deep endocervical margins. Immunohistochemical staining for p16 shows diffuse and strong focal   staining of endocervical glands. The findings are suspicious for at least   adenocarcinoma in situ. Control stains appropriately. 2022:  * * *FINAL PATHOLOGIC DIAGNOSIS* * *   Endocervical curettage:        Adenocarcinoma in situ (at least), see comment. * * *Comment* * *   The nuclear and architectural features are compatible with at least   adenocarcinoma in situ. The tissue is fragmented and limited, precluding   further evaluation to exclude an invasive tumor. Immunohistochemical   stains (p16, p53) were performed on block 1A with appropriately reactive   controls. The p16 is diffusely positive with scattered p53 positivity. While this favors endocervical origin, correlation with clinical and   radiographic impression is suggested to definitively characterize the   primary site of this malignancy. Clinical correlation is recommended. This case was shown to one or more pathologists for consultation, and they   concur with the diagnosis. ROS:  A comprehensive review of systems was negative except for that written in the History of Present Illness.  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    Bruit of left carotid artery 10/01/2018    H/O colonoscopy 01/29/2018    Mitral regurgitation 01/29/2018    Vitamin D insufficiency 01/29/2018    Hashimoto's disease 12/01/2017    Celiac disease 12/01/2017    Generalized anxiety disorder 12/01/2017    Osteoporosis 12/01/2017     PMH:  Past Medical History:   Diagnosis Date    Arrhythmia     MURMUR    Celiac disease     SY (generalized anxiety disorder)     Hashimoto's disease     Mitral regurgitation     Osteoporosis       PSH:  Past Surgical History:   Procedure Laterality Date    COLONOSCOPY N/A 05/06/2019    COLONOSCOPY WTIH EMR performed by Nancy Colbert MD at [facility] ENDOSCOPY    COLONOSCOPY N/A 11/04/2019    COLONOSCOPY performed by Nancy Colbert MD at [facility] ENDOSCOPY    HX CATARACT REMOVAL      HX CERVICAL POLYPECTOMY  2018    removed - Dr. Guerda Granda    HX GYN  2001    tubal ligation    HX ORTHOPAEDIC Right 03/2021    ROTATOR CUFF REPAIR    HX OTHER SURGICAL  12/2017    Left Inguinal Node Biopsy      Social History:  Social History     Tobacco Use    Smoking status: Former     Packs/day: 1.00     Years: 28.00     Pack years: 28.00     Types: Cigarettes     Quit date: 2006     Years since quittin.8    Smokeless tobacco: Never   Substance Use Topics    Alcohol use: No      Family History:  Family History   Problem Relation Age of Onset    OSTEOARTHRITIS Mother     Dementia Mother     Cancer Father         gleoblastoma- brain    Breast Cancer Sister 58    Thyroid Disease Sister     Lupus Sister     Lupus Sister     Thyroid Disease Sister     Thyroid Disease Sister     No Known Problems Sister     Dementia Brother     No Known Problems Brother     Breast Cancer Maternal Grandmother     Diabetes Maternal Grandmother     Cancer Maternal Grandfather         liver    No Known Problems Paternal Grandmother     No Known Problems Paternal Grandfather     Other Daughter         PCOS    Anesth Problems Neg Hx       Medications: (reviewed)  Current Outpatient Medications   Medication Sig    Omega-3 Fatty Acids 60- mg cpDR Take  by mouth daily. ascorbic acid, vitamin C, (VITAMIN C) 500 mg tablet Take 500 mg by mouth daily. turmeric 400 mg cap Take  by mouth daily. grape seed extract (GRAPE SEED PO) Take  by mouth daily. vitamin H-K-K-lutein-minerals (OCUVITE) tablet 1 Tablet daily. aspirin 81 mg chewable tablet Take 81 mg by mouth daily. calcium carbonate (CALCIUM 600 PO) Take 600 mg by mouth daily. mv-min/iron/folic/calcium/vitK (WOMEN'S MULTIVITAMIN PO) Take  by mouth. SYNTHROID 25 mcg tablet Take 50 mcg by mouth Daily (before breakfast). No current facility-administered medications for this visit. Allergies: (reviewed)  Allergies   Allergen Reactions    Penicillins Hives    Clindamycin Hives    Wheat Unknown (comments)     wilton Thompson (Celiac)          OBJECTIVE:  *deferred today given video-conference visit for ongoing COVID-19 pandemic*   Physical Exam:  VITAL SIGNS: There were no vitals filed for this visit. There is no height or weight on file to calculate BMI. GENERAL DARCY: Conversant, alert, oriented. No acute distress. HEENT: HEENT. No thyroid enlargement. No JVD. Neck: Supple without restrictions. RESPIRATORY: Clear to auscultation and percussion to the bases. No CVAT. CARDIOVASC: RRR without murmur/rub. GASTROINT: soft, non-tender, without masses or organomegaly   MUSCULOSKEL: no joint tenderness, deformity or swelling       EXTREMITIES: extremities normal, atraumatic, no cyanosis or edema   PELVIC: Exam chaperoned by nurse. Normal appearing external genitalia. On speculum exam, normal appearing vagina and cervix. On bimanual exam, the cervix and uterus are normal size and mobile. No evidence of adnexal masses or nodularity. RECTAL: deferred   PATY SURVEY: No suspicious lymphadenopathy or edema noted. NEURO: Grossly intact. No acute deficit. Lab Date as available:    Lab Results   Component Value Date/Time    WBC 5.2 10/21/2022 08:13 AM    HGB 15.0 10/21/2022 08:13 AM    HCT 46.0 10/21/2022 08:13 AM    PLATELET 936 77/00/8931 08:13 AM    MCV 95.2 10/21/2022 08:13 AM     Lab Results   Component Value Date/Time    Sodium 141 10/21/2022 08:13 AM    Potassium 4.3 10/21/2022 08:13 AM    Chloride 106 10/21/2022 08:13 AM    CO2 29 10/21/2022 08:13 AM    Anion gap 6 10/21/2022 08:13 AM    Glucose 95 10/21/2022 08:13 AM    BUN 16 10/21/2022 08:13 AM    Creatinine 0.78 10/21/2022 08:13 AM    BUN/Creatinine ratio 21 (H) 10/21/2022 08:13 AM    GFR est  01/18/2018 08:25 AM    GFR est non-AA 88 01/18/2018 08:25 AM    Calcium 9.7 10/21/2022 08:13 AM         IMPRESSION/PLAN:    Ms. Elia Murrell is a 60 y.o. female who presented with cervical AIS. On 10/28/2022 underwent Cold knife conization of cervix, Endocervical curettage. Final pathology consistent with AIS, but with positive margins.      Problems:     Patient Active Problem List    Diagnosis Date Noted    Adenocarcinoma in situ (AIS) of uterine cervix 09/21/2022    Bruit of left carotid artery 10/01/2018    H/O colonoscopy 01/29/2018    Mitral regurgitation 01/29/2018    Vitamin D insufficiency 01/29/2018    Hashimoto's disease 12/01/2017    Celiac disease 12/01/2017    Generalized anxiety disorder 12/01/2017    Osteoporosis 12/01/2017     Reviewed patient's course to date, including her surgical pathology consistent with AIS. Unfortunately she has a very small cervix and a larger conization is not feasible. I have discussed proceeding with a TLH/BSO for both diagnostic and therapeutic purpose. Will post for surgery in 4-6 weeks at the patient's convenience. Reviewed risks, benefits, indications, and alternatives of surgery. Do not need to repeat preop labs or CXR or EKG. All questions and concerns were addressed with the patient and she is comfortable with the plan. An electronic signature was used to authenticate this note. Kory Gerber MD    Pursuant to the emergency declaration unde the [facility], [facility] waiver authority and the [employer] and [employer] Act, this Virtual Visit was conducted, with patient's consent, to reduce the patient's risk of exposure to COVID-19 and provide continuity of care for an established patient. Patient identification was verified at the start of the visit: Yes    Services were provided through a video synchronous discussion virtually to substitute for in-person clinic visit. Patient was at her individual home, while the provider was in his/her respective office. I spent at least 40 minutes with this established patient, and >50% of the time was spent counseling and/or coordinating care regarding pathology and management discussion.      Kory Gerber MD

## 2022-11-11 ENCOUNTER — TELEPHONE (OUTPATIENT)
Dept: GYNECOLOGY | Age: 62
End: 2022-11-11

## 2022-12-02 RX ORDER — LEVOTHYROXINE SODIUM 50 UG/1
TABLET ORAL
COMMUNITY

## 2022-12-02 RX ORDER — LEVOTHYROXINE SODIUM 50 UG/1
100 TABLET ORAL
COMMUNITY

## 2022-12-02 NOTE — PERIOP NOTES
[address] INSTRUCTIONS    Surgery Date:   12/8/22    Your surgeon's office or [facility] staff will call you between 4 PM- 8 PM the day before surgery with your arrival time. If your surgery is on a Monday, you will receive a call the preceding Friday. Please report to [facility] Patient Access/Admitting on the 1st floor. Bring your insurance card, photo identification, and any copayment ( if applicable). If you are going home the same day of your surgery, you must have a responsible adult to drive you home. You need to have a responsible adult to stay with you the first 24 hours after surgery and you should not drive a car for 24 hours following your surgery. Nothing to eat or drink after midnight the night before surgery. This includes no water, gum, mints, coffee, juice, etc.  Please note special instructions, if applicable, below for medications. Do NOT drink alcohol or smoke 24 hours before surgery. STOP smoking for 14 days prior as it helps with breathing and healing after surgery. If you are being admitted to the hospital, please leave personal belongings/luggage in your car until you have an assigned hospital room number. Please wear comfortable clothes. Wear your glasses instead of contacts. We ask that all money, jewelry and valuables be left at home. Wear no make up, particularly mascara, the day of surgery. All body piercings, rings, and jewelry need to be removed and left at home. Please remove any nail polish or artificial nails from your fingernails. Please wear your hair loose or down. Please no pony-tails, buns, or any metal hair accessories. If you shower the morning of surgery, please do not apply any lotions or powders afterwards. You may wear deodorant, unless having breast surgery. Do not shave any body area within 24 hours of your surgery. Please follow all instructions to avoid any potential surgical cancellation.   Should your physical condition change, (i.e. fever, cold, flu, etc.) please notify your surgeon as soon as possible. It is important to be on time. If a situation occurs where you may be delayed, please call:  [phone] / [phone] on the day of surgery. The Preadmission Testing staff can be reached at [phone]. Special instructions: BRING COVID CARD ON DAY OF SURGERY      No current facility-administered medications for this encounter. Current Outpatient Medications   Medication Sig    levothyroxine (synthroid) 50 mcg tablet Take  by mouth Daily (before breakfast). ON Monday, Tuesday, Wednesday, Thursday,Friday AND Sunday    levothyroxine (synthroid) 50 mcg tablet Take 100 mcg by mouth Daily (before breakfast). ON SATURDAY    Omega-3 Fatty Acids 60- mg cpDR Take  by mouth daily. ascorbic acid, vitamin C, (VITAMIN C) 500 mg tablet Take 500 mg by mouth daily. turmeric 400 mg cap Take  by mouth daily. grape seed extract (GRAPE SEED PO) Take  by mouth daily. vitamin D-B-N-lutein-minerals (OCUVITE) tablet 1 Tablet daily. aspirin 81 mg chewable tablet Take 81 mg by mouth daily. calcium carbonate (CALCIUM 600 PO) Take 600 mg by mouth daily. mv-min/iron/folic/calcium/vitK (WOMEN'S MULTIVITAMIN PO) Take  by mouth. YOU MUST ONLY TAKE THESE MEDICATIONS THE MORNING OF SURGERY WITH A SIP OF WATER: SYNTHROID  MEDICATIONS TO TAKE THE MORNING OF SURGERY ONLY IF NEEDED:   HOLD these prescription medications BEFORE Surgery:   Ask your surgeon/prescribing physician about when/if to STOP taking these medications: ASPIRIN  Stop all vitamins, herbal medicines and Aspirin containing products 7 days prior to surgery. Stop any non-steroidal anti-inflammatory drugs (i.e. Ibuprofen, Naproxen, Advil, Aleve) 3 days before surgery. You may take Tylenol.     STOP VITAMIN C, MULTIVITAMIN, FISH OIL, CALCIUM,OCUVITE, GRAPE SEED, TURMERIC, LUTEIN ON 12/2/22  If you are currently taking Plavix, Coumadin, or any other blood-thinning/anticoagulant medication contact your prescribing physician for instructions. Preventing Infections Before and After - Your Surgery    IMPORTANT INSTRUCTIONS      You play an important role in your health and preparation for surgery. To reduce the germs on your skin you will need to shower with CHG soap (Chorhexidine gluconate 4%) two times before surgery. CHG soap (Hibiclens, Hex-A-Clens or store brand)  CHG soap will be provided at your Preadmission Testing (PAT) appointment. If you do not have a PAT appointment before surgery, you may arrange to  CHG soap from our office or purchase CHG soap at a pharmacy, grocery or department store. You need to purchase TWO 4 ounce bottles to use for your 2 showers. Steps to follow:  Fco Dose your hair with your normal shampoo and your body with regular soap and rinse well to remove shampoo and soap from your skin. Wet a clean washcloth and turn off the shower. Put CHG soap on washcloth and apply to your entire body from the neck down. Do not use on your head, face or private parts(genitals). Do not use CHG soap on open sores, wounds or areas of skin irritation. Wash you body gently for 5 minutes. Do not wash your skin too hard. This soap does not create lather. Pay special attention to your underarms and from your belly button to your feet. Turn the shower back on and rinse well to get CHG soap off your body. Pat your skin dry with a clean, dry towel. Do not apply lotions or moisturizer. Put on clean clothes and sleep on fresh bed sheets and do not allow pets to sleep with you. Shower with CHG soap 2 times before your surgery  The evening before your surgery  The morning of your surgery      Tips to help prevent infections after your surgery:  Protect your surgical wound from germs:  Hand washing is the most important thing you and your caregivers can do to prevent infections. Keep your bandage clean and dry!   Do not touch your surgical wound.  Use clean, freshly washed towels and washcloths every time you shower; do not share bath linens with others. Until your surgical wound is healed, wear clothing and sleep on bed linens each day that are clean and freshly washed. Do not allow pets to sleep in your bed with you or touch your surgical wound. Do not smoke - smoking delays wound healing. This may be a good time to stop smoking. If you have diabetes, it is important for you to manage your blood sugar levels properly before your surgery as well as after your surgery. Poorly managed blood sugar levels slow down wound healing and prevent you from healing completely. Patient Information Regarding COVID Restrictions    Day of Procedure    Please park in the parking deck or any designated visitor parking lot. Enter the facility through the Main Entrance of the hospital.  On the day of surgery, please provide the cell phone number of the person who will be waiting for you to the Patient Access representative at the time of registration. Masks are highly recommended in the hospital, but not required. Once your procedure and the immediate recovery period is completed, a nurse in the recovery area will contact your designated visitor to inform them of your room number or to otherwise review other pertinent information regarding your care. Social distancing practices are strongly encouraged in waiting areas and the cafeteria. The patient was contacted  via phone. She verbalized understanding of all instructions does not  need reinforcement.

## 2022-12-07 ENCOUNTER — ANESTHESIA EVENT (OUTPATIENT)
Dept: SURGERY | Age: 62
End: 2022-12-07
Payer: COMMERCIAL

## 2022-12-08 ENCOUNTER — HOSPITAL ENCOUNTER (OUTPATIENT)
Age: 62
Setting detail: OBSERVATION
Discharge: HOME | End: 2022-12-09
Attending: OBSTETRICS & GYNECOLOGY | Admitting: OBSTETRICS & GYNECOLOGY
Payer: COMMERCIAL

## 2022-12-08 ENCOUNTER — ANESTHESIA (OUTPATIENT)
Dept: SURGERY | Age: 62
End: 2022-12-08
Payer: COMMERCIAL

## 2022-12-08 ENCOUNTER — DOCUMENTATION ONLY (OUTPATIENT)
Dept: GYNECOLOGY | Age: 62
End: 2022-12-08

## 2022-12-08 PROBLEM — C53.9 CERVICAL CANCER (HCC): Status: ACTIVE | Noted: 2022-12-08

## 2022-12-08 PROCEDURE — 74011000250 HC RX REV CODE- 250: Performed by: NURSE ANESTHETIST, CERTIFIED REGISTERED

## 2022-12-08 PROCEDURE — 76060000034 HC ANESTHESIA 1.5 TO 2 HR: Performed by: OBSTETRICS & GYNECOLOGY

## 2022-12-08 PROCEDURE — 77030008603 HC TRCR ENDOSC EPATH J&J -C: Performed by: OBSTETRICS & GYNECOLOGY

## 2022-12-08 PROCEDURE — 74011250636 HC RX REV CODE- 250/636: Performed by: NURSE ANESTHETIST, CERTIFIED REGISTERED

## 2022-12-08 PROCEDURE — 2709999900 HC NON-CHARGEABLE SUPPLY: Performed by: OBSTETRICS & GYNECOLOGY

## 2022-12-08 PROCEDURE — 88309 TISSUE EXAM BY PATHOLOGIST: CPT

## 2022-12-08 PROCEDURE — G0378 HOSPITAL OBSERVATION PER HR: HCPCS

## 2022-12-08 PROCEDURE — 77030022704 HC SUT VLOC COVD -B: Performed by: OBSTETRICS & GYNECOLOGY

## 2022-12-08 PROCEDURE — 74011000258 HC RX REV CODE- 258: Performed by: NURSE ANESTHETIST, CERTIFIED REGISTERED

## 2022-12-08 PROCEDURE — 77030037285 HC MANIP UTER DELINTR ADVINCULA DISP COOP -C: Performed by: OBSTETRICS & GYNECOLOGY

## 2022-12-08 PROCEDURE — 77030012799 HC TRCR GELPRT BLN AMR -B: Performed by: OBSTETRICS & GYNECOLOGY

## 2022-12-08 PROCEDURE — 77030009957 HC RELD ENDOSTCH COVD -C: Performed by: OBSTETRICS & GYNECOLOGY

## 2022-12-08 PROCEDURE — 77030040922 HC BLNKT HYPOTHRM STRY -A

## 2022-12-08 PROCEDURE — 74011250636 HC RX REV CODE- 250/636: Performed by: ANESTHESIOLOGY

## 2022-12-08 PROCEDURE — 74011250636 HC RX REV CODE- 250/636

## 2022-12-08 PROCEDURE — 76210000000 HC OR PH I REC 2 TO 2.5 HR: Performed by: OBSTETRICS & GYNECOLOGY

## 2022-12-08 PROCEDURE — 76010000153 HC OR TIME 1.5 TO 2 HR: Performed by: OBSTETRICS & GYNECOLOGY

## 2022-12-08 PROCEDURE — 74011250636 HC RX REV CODE- 250/636: Performed by: PHYSICIAN ASSISTANT

## 2022-12-08 PROCEDURE — 74011000250 HC RX REV CODE- 250: Performed by: OBSTETRICS & GYNECOLOGY

## 2022-12-08 PROCEDURE — 77030008606 HC TRCR ENDOSC KII AMR -B: Performed by: OBSTETRICS & GYNECOLOGY

## 2022-12-08 PROCEDURE — 74011000250 HC RX REV CODE- 250: Performed by: ANESTHESIOLOGY

## 2022-12-08 PROCEDURE — P9045 ALBUMIN (HUMAN), 5%, 250 ML: HCPCS

## 2022-12-08 PROCEDURE — 74011250637 HC RX REV CODE- 250/637: Performed by: ANESTHESIOLOGY

## 2022-12-08 PROCEDURE — 74011250637 HC RX REV CODE- 250/637: Performed by: PHYSICIAN ASSISTANT

## 2022-12-08 PROCEDURE — 77030014008 HC SPNG HEMSTAT J&J -C: Performed by: OBSTETRICS & GYNECOLOGY

## 2022-12-08 PROCEDURE — 74011000250 HC RX REV CODE- 250: Performed by: PHYSICIAN ASSISTANT

## 2022-12-08 PROCEDURE — 74011000250 HC RX REV CODE- 250

## 2022-12-08 PROCEDURE — 77030008602 HC TRCR ENDOSC EPATH J&J -B: Performed by: OBSTETRICS & GYNECOLOGY

## 2022-12-08 PROCEDURE — 77030014090 HC TRCR OPT AMR -B: Performed by: OBSTETRICS & GYNECOLOGY

## 2022-12-08 PROCEDURE — P9045 ALBUMIN (HUMAN), 5%, 250 ML: HCPCS | Performed by: ANESTHESIOLOGY

## 2022-12-08 PROCEDURE — 77030010031 HC SCIS ENDOSC MPLR J&J -C: Performed by: OBSTETRICS & GYNECOLOGY

## 2022-12-08 PROCEDURE — 77030039895 HC SYST SMK EVAC LAP COVD -B: Performed by: OBSTETRICS & GYNECOLOGY

## 2022-12-08 PROCEDURE — 77030031139 HC SUT VCRL2 J&J -A: Performed by: OBSTETRICS & GYNECOLOGY

## 2022-12-08 RX ORDER — ROCURONIUM BROMIDE 10 MG/ML
INJECTION, SOLUTION INTRAVENOUS AS NEEDED
Status: DISCONTINUED | OUTPATIENT
Start: 2022-12-08 | End: 2022-12-08 | Stop reason: HOSPADM

## 2022-12-08 RX ORDER — ALBUTEROL SULFATE 0.83 MG/ML
2.5 SOLUTION RESPIRATORY (INHALATION) AS NEEDED
Status: DISCONTINUED | OUTPATIENT
Start: 2022-12-08 | End: 2022-12-08 | Stop reason: HOSPADM

## 2022-12-08 RX ORDER — HYDROMORPHONE HYDROCHLORIDE 1 MG/ML
0.2 INJECTION, SOLUTION INTRAMUSCULAR; INTRAVENOUS; SUBCUTANEOUS
Status: DISCONTINUED | OUTPATIENT
Start: 2022-12-08 | End: 2022-12-08 | Stop reason: HOSPADM

## 2022-12-08 RX ORDER — DOCUSATE SODIUM 100 MG/1
100 CAPSULE, LIQUID FILLED ORAL DAILY
Qty: 30 CAPSULE | Refills: 0 | Status: SHIPPED | OUTPATIENT
Start: 2022-12-08 | End: 2023-01-07

## 2022-12-08 RX ORDER — NORETHINDRONE AND ETHINYL ESTRADIOL 0.5-0.035
10 KIT ORAL ONCE
Status: ACTIVE | OUTPATIENT
Start: 2022-12-08 | End: 2022-12-09

## 2022-12-08 RX ORDER — ACETAMINOPHEN 325 MG/1
650 TABLET ORAL EVERY 6 HOURS
Status: DISCONTINUED | OUTPATIENT
Start: 2022-12-08 | End: 2022-12-09 | Stop reason: HOSPADM

## 2022-12-08 RX ORDER — NEOSTIGMINE METHYLSULFATE 1 MG/ML
INJECTION, SOLUTION INTRAVENOUS AS NEEDED
Status: DISCONTINUED | OUTPATIENT
Start: 2022-12-08 | End: 2022-12-08 | Stop reason: HOSPADM

## 2022-12-08 RX ORDER — ROPIVACAINE HYDROCHLORIDE 5 MG/ML
30 INJECTION, SOLUTION EPIDURAL; INFILTRATION; PERINEURAL AS NEEDED
Status: DISCONTINUED | OUTPATIENT
Start: 2022-12-08 | End: 2022-12-08 | Stop reason: HOSPADM

## 2022-12-08 RX ORDER — BUPIVACAINE HYDROCHLORIDE 5 MG/ML
INJECTION, SOLUTION EPIDURAL; INTRACAUDAL AS NEEDED
Status: DISCONTINUED | OUTPATIENT
Start: 2022-12-08 | End: 2022-12-08 | Stop reason: HOSPADM

## 2022-12-08 RX ORDER — CEFOXITIN 2 G/1
INJECTION, POWDER, FOR SOLUTION INTRAVENOUS AS NEEDED
Status: DISCONTINUED | OUTPATIENT
Start: 2022-12-08 | End: 2022-12-08 | Stop reason: HOSPADM

## 2022-12-08 RX ORDER — SODIUM CHLORIDE 9 MG/ML
75 INJECTION, SOLUTION INTRAVENOUS CONTINUOUS
Status: DISCONTINUED | OUTPATIENT
Start: 2022-12-08 | End: 2022-12-09 | Stop reason: HOSPADM

## 2022-12-08 RX ORDER — ALBUMIN HUMAN 50 G/1000ML
SOLUTION INTRAVENOUS
Status: COMPLETED
Start: 2022-12-08 | End: 2022-12-08

## 2022-12-08 RX ORDER — KETOROLAC TROMETHAMINE 30 MG/ML
15 INJECTION, SOLUTION INTRAMUSCULAR; INTRAVENOUS EVERY 6 HOURS
Status: DISCONTINUED | OUTPATIENT
Start: 2022-12-08 | End: 2022-12-09 | Stop reason: HOSPADM

## 2022-12-08 RX ORDER — SODIUM CHLORIDE, SODIUM LACTATE, POTASSIUM CHLORIDE, CALCIUM CHLORIDE 600; 310; 30; 20 MG/100ML; MG/100ML; MG/100ML; MG/100ML
1000 INJECTION, SOLUTION INTRAVENOUS CONTINUOUS
Status: DISCONTINUED | OUTPATIENT
Start: 2022-12-08 | End: 2022-12-08 | Stop reason: HOSPADM

## 2022-12-08 RX ORDER — HYDROCORTISONE SODIUM SUCCINATE 100 MG/2ML
100 INJECTION, POWDER, FOR SOLUTION INTRAMUSCULAR; INTRAVENOUS ONCE
Status: COMPLETED | OUTPATIENT
Start: 2022-12-08 | End: 2022-12-08

## 2022-12-08 RX ORDER — SODIUM CHLORIDE 0.9 % (FLUSH) 0.9 %
5-40 SYRINGE (ML) INJECTION EVERY 8 HOURS
Status: DISCONTINUED | OUTPATIENT
Start: 2022-12-08 | End: 2022-12-09 | Stop reason: HOSPADM

## 2022-12-08 RX ORDER — HYDROCODONE BITARTRATE AND ACETAMINOPHEN 5; 325 MG/1; MG/1
1 TABLET ORAL AS NEEDED
Status: DISCONTINUED | OUTPATIENT
Start: 2022-12-08 | End: 2022-12-08 | Stop reason: HOSPADM

## 2022-12-08 RX ORDER — DOCUSATE SODIUM 100 MG/1
100 CAPSULE, LIQUID FILLED ORAL 2 TIMES DAILY
Status: DISCONTINUED | OUTPATIENT
Start: 2022-12-08 | End: 2022-12-09 | Stop reason: HOSPADM

## 2022-12-08 RX ORDER — MORPHINE SULFATE 2 MG/ML
2 INJECTION, SOLUTION INTRAMUSCULAR; INTRAVENOUS
Status: DISCONTINUED | OUTPATIENT
Start: 2022-12-08 | End: 2022-12-08 | Stop reason: HOSPADM

## 2022-12-08 RX ORDER — DIPHENHYDRAMINE HYDROCHLORIDE 50 MG/ML
12.5 INJECTION, SOLUTION INTRAMUSCULAR; INTRAVENOUS
Status: DISCONTINUED | OUTPATIENT
Start: 2022-12-08 | End: 2022-12-09 | Stop reason: HOSPADM

## 2022-12-08 RX ORDER — LIDOCAINE HYDROCHLORIDE ANHYDROUS AND DEXTROSE MONOHYDRATE .8; 5 G/100ML; G/100ML
INJECTION, SOLUTION INTRAVENOUS
Status: DISCONTINUED | OUTPATIENT
Start: 2022-12-08 | End: 2022-12-08 | Stop reason: HOSPADM

## 2022-12-08 RX ORDER — SUCCINYLCHOLINE CHLORIDE 20 MG/ML
INJECTION INTRAMUSCULAR; INTRAVENOUS AS NEEDED
Status: DISCONTINUED | OUTPATIENT
Start: 2022-12-08 | End: 2022-12-08 | Stop reason: HOSPADM

## 2022-12-08 RX ORDER — SODIUM CHLORIDE 9 MG/ML
25 INJECTION, SOLUTION INTRAVENOUS CONTINUOUS
Status: DISCONTINUED | OUTPATIENT
Start: 2022-12-08 | End: 2022-12-08 | Stop reason: HOSPADM

## 2022-12-08 RX ORDER — MIDAZOLAM HYDROCHLORIDE 1 MG/ML
1 INJECTION, SOLUTION INTRAMUSCULAR; INTRAVENOUS AS NEEDED
Status: DISCONTINUED | OUTPATIENT
Start: 2022-12-08 | End: 2022-12-08 | Stop reason: HOSPADM

## 2022-12-08 RX ORDER — NORETHINDRONE AND ETHINYL ESTRADIOL 0.5-0.035
KIT ORAL
Status: COMPLETED
Start: 2022-12-08 | End: 2022-12-08

## 2022-12-08 RX ORDER — HYDROMORPHONE HYDROCHLORIDE 1 MG/ML
1 INJECTION, SOLUTION INTRAMUSCULAR; INTRAVENOUS; SUBCUTANEOUS
Status: DISCONTINUED | OUTPATIENT
Start: 2022-12-08 | End: 2022-12-09 | Stop reason: HOSPADM

## 2022-12-08 RX ORDER — NALOXONE HYDROCHLORIDE 0.4 MG/ML
0.4 INJECTION, SOLUTION INTRAMUSCULAR; INTRAVENOUS; SUBCUTANEOUS AS NEEDED
Status: DISCONTINUED | OUTPATIENT
Start: 2022-12-08 | End: 2022-12-09 | Stop reason: HOSPADM

## 2022-12-08 RX ORDER — ALBUMIN HUMAN 50 G/1000ML
12.5 SOLUTION INTRAVENOUS ONCE
Status: DISCONTINUED | OUTPATIENT
Start: 2022-12-08 | End: 2022-12-08 | Stop reason: HOSPADM

## 2022-12-08 RX ORDER — ACETAMINOPHEN 325 MG/1
650 TABLET ORAL ONCE
Status: COMPLETED | OUTPATIENT
Start: 2022-12-08 | End: 2022-12-08

## 2022-12-08 RX ORDER — FENTANYL CITRATE 50 UG/ML
25 INJECTION, SOLUTION INTRAMUSCULAR; INTRAVENOUS
Status: COMPLETED | OUTPATIENT
Start: 2022-12-08 | End: 2022-12-08

## 2022-12-08 RX ORDER — MIDAZOLAM HYDROCHLORIDE 1 MG/ML
0.5 INJECTION, SOLUTION INTRAMUSCULAR; INTRAVENOUS
Status: DISCONTINUED | OUTPATIENT
Start: 2022-12-08 | End: 2022-12-08 | Stop reason: HOSPADM

## 2022-12-08 RX ORDER — MIDAZOLAM HYDROCHLORIDE 1 MG/ML
INJECTION, SOLUTION INTRAMUSCULAR; INTRAVENOUS AS NEEDED
Status: DISCONTINUED | OUTPATIENT
Start: 2022-12-08 | End: 2022-12-08 | Stop reason: HOSPADM

## 2022-12-08 RX ORDER — SODIUM CHLORIDE 0.9 % (FLUSH) 0.9 %
5-40 SYRINGE (ML) INJECTION AS NEEDED
Status: DISCONTINUED | OUTPATIENT
Start: 2022-12-08 | End: 2022-12-09 | Stop reason: HOSPADM

## 2022-12-08 RX ORDER — ALBUMIN HUMAN 50 G/1000ML
12.5 SOLUTION INTRAVENOUS ONCE
Status: COMPLETED | OUTPATIENT
Start: 2022-12-08 | End: 2022-12-08

## 2022-12-08 RX ORDER — FENTANYL CITRATE 50 UG/ML
50 INJECTION, SOLUTION INTRAMUSCULAR; INTRAVENOUS AS NEEDED
Status: DISCONTINUED | OUTPATIENT
Start: 2022-12-08 | End: 2022-12-08 | Stop reason: HOSPADM

## 2022-12-08 RX ORDER — DEXAMETHASONE SODIUM PHOSPHATE 4 MG/ML
INJECTION, SOLUTION INTRA-ARTICULAR; INTRALESIONAL; INTRAMUSCULAR; INTRAVENOUS; SOFT TISSUE AS NEEDED
Status: DISCONTINUED | OUTPATIENT
Start: 2022-12-08 | End: 2022-12-08 | Stop reason: HOSPADM

## 2022-12-08 RX ORDER — NORETHINDRONE AND ETHINYL ESTRADIOL 0.5-0.035
10 KIT ORAL ONCE
Status: COMPLETED | OUTPATIENT
Start: 2022-12-08 | End: 2022-12-08

## 2022-12-08 RX ORDER — ONDANSETRON 2 MG/ML
4 INJECTION INTRAMUSCULAR; INTRAVENOUS AS NEEDED
Status: DISCONTINUED | OUTPATIENT
Start: 2022-12-08 | End: 2022-12-08 | Stop reason: HOSPADM

## 2022-12-08 RX ORDER — SODIUM CHLORIDE, SODIUM LACTATE, POTASSIUM CHLORIDE, CALCIUM CHLORIDE 600; 310; 30; 20 MG/100ML; MG/100ML; MG/100ML; MG/100ML
INJECTION, SOLUTION INTRAVENOUS
Status: DISCONTINUED | OUTPATIENT
Start: 2022-12-08 | End: 2022-12-08 | Stop reason: HOSPADM

## 2022-12-08 RX ORDER — GLYCOPYRROLATE 0.2 MG/ML
0.2 INJECTION INTRAMUSCULAR; INTRAVENOUS
Status: DISCONTINUED | OUTPATIENT
Start: 2022-12-08 | End: 2022-12-08 | Stop reason: HOSPADM

## 2022-12-08 RX ORDER — ALBUMIN HUMAN 50 G/1000ML
25 SOLUTION INTRAVENOUS ONCE
Status: COMPLETED | OUTPATIENT
Start: 2022-12-08 | End: 2022-12-08

## 2022-12-08 RX ORDER — FENTANYL CITRATE 50 UG/ML
INJECTION, SOLUTION INTRAMUSCULAR; INTRAVENOUS AS NEEDED
Status: DISCONTINUED | OUTPATIENT
Start: 2022-12-08 | End: 2022-12-08 | Stop reason: HOSPADM

## 2022-12-08 RX ORDER — GLYCOPYRROLATE 0.2 MG/ML
INJECTION INTRAMUSCULAR; INTRAVENOUS AS NEEDED
Status: DISCONTINUED | OUTPATIENT
Start: 2022-12-08 | End: 2022-12-08 | Stop reason: HOSPADM

## 2022-12-08 RX ORDER — MAGNESIUM SULFATE HEPTAHYDRATE 40 MG/ML
INJECTION, SOLUTION INTRAVENOUS AS NEEDED
Status: DISCONTINUED | OUTPATIENT
Start: 2022-12-08 | End: 2022-12-08 | Stop reason: HOSPADM

## 2022-12-08 RX ORDER — LEVOTHYROXINE SODIUM 50 UG/1
50 TABLET ORAL
Status: DISCONTINUED | OUTPATIENT
Start: 2022-12-09 | End: 2022-12-09 | Stop reason: HOSPADM

## 2022-12-08 RX ORDER — DIPHENHYDRAMINE HYDROCHLORIDE 50 MG/ML
12.5 INJECTION, SOLUTION INTRAMUSCULAR; INTRAVENOUS AS NEEDED
Status: COMPLETED | OUTPATIENT
Start: 2022-12-08 | End: 2022-12-08

## 2022-12-08 RX ORDER — PROPOFOL 10 MG/ML
INJECTION, EMULSION INTRAVENOUS AS NEEDED
Status: DISCONTINUED | OUTPATIENT
Start: 2022-12-08 | End: 2022-12-08 | Stop reason: HOSPADM

## 2022-12-08 RX ORDER — LIDOCAINE HYDROCHLORIDE 20 MG/ML
INJECTION, SOLUTION EPIDURAL; INFILTRATION; INTRACAUDAL; PERINEURAL AS NEEDED
Status: DISCONTINUED | OUTPATIENT
Start: 2022-12-08 | End: 2022-12-08 | Stop reason: HOSPADM

## 2022-12-08 RX ORDER — LIDOCAINE HYDROCHLORIDE 10 MG/ML
0.1 INJECTION, SOLUTION EPIDURAL; INFILTRATION; INTRACAUDAL; PERINEURAL AS NEEDED
Status: DISCONTINUED | OUTPATIENT
Start: 2022-12-08 | End: 2022-12-08 | Stop reason: HOSPADM

## 2022-12-08 RX ORDER — FAMOTIDINE 20 MG/1
20 TABLET, FILM COATED ORAL 2 TIMES DAILY
Status: DISCONTINUED | OUTPATIENT
Start: 2022-12-08 | End: 2022-12-09 | Stop reason: HOSPADM

## 2022-12-08 RX ORDER — TRAMADOL HYDROCHLORIDE 50 MG/1
50 TABLET ORAL
Status: DISCONTINUED | OUTPATIENT
Start: 2022-12-08 | End: 2022-12-09 | Stop reason: HOSPADM

## 2022-12-08 RX ORDER — ONDANSETRON 2 MG/ML
4 INJECTION INTRAMUSCULAR; INTRAVENOUS
Status: DISCONTINUED | OUTPATIENT
Start: 2022-12-08 | End: 2022-12-09 | Stop reason: HOSPADM

## 2022-12-08 RX ORDER — ONDANSETRON 2 MG/ML
INJECTION INTRAMUSCULAR; INTRAVENOUS AS NEEDED
Status: DISCONTINUED | OUTPATIENT
Start: 2022-12-08 | End: 2022-12-08 | Stop reason: HOSPADM

## 2022-12-08 RX ORDER — FENTANYL CITRATE 50 UG/ML
INJECTION, SOLUTION INTRAMUSCULAR; INTRAVENOUS
Status: COMPLETED
Start: 2022-12-08 | End: 2022-12-08

## 2022-12-08 RX ADMIN — LIDOCAINE HYDROCHLORIDE 80 MG: 20 INJECTION, SOLUTION EPIDURAL; INFILTRATION; INTRACAUDAL; PERINEURAL at 11:32

## 2022-12-08 RX ADMIN — FAMOTIDINE 20 MG: 20 TABLET, FILM COATED ORAL at 17:21

## 2022-12-08 RX ADMIN — ALBUMIN HUMAN 12.5 G: 50 SOLUTION INTRAVENOUS at 13:25

## 2022-12-08 RX ADMIN — ACETAMINOPHEN 650 MG: 325 TABLET ORAL at 17:21

## 2022-12-08 RX ADMIN — PROPOFOL 100 MG: 10 INJECTION, EMULSION INTRAVENOUS at 11:32

## 2022-12-08 RX ADMIN — DEXAMETHASONE SODIUM PHOSPHATE 8 MG: 4 INJECTION, SOLUTION INTRAMUSCULAR; INTRAVENOUS at 11:40

## 2022-12-08 RX ADMIN — LIDOCAINE HYDROCHLORIDE 1 MG/KG/HR: 8 INJECTION, SOLUTION INTRAVENOUS at 11:45

## 2022-12-08 RX ADMIN — ONDANSETRON HYDROCHLORIDE 4 MG: 2 INJECTION, SOLUTION INTRAMUSCULAR; INTRAVENOUS at 12:46

## 2022-12-08 RX ADMIN — FENTANYL CITRATE 25 MCG: 50 INJECTION INTRAMUSCULAR; INTRAVENOUS at 11:32

## 2022-12-08 RX ADMIN — ONDANSETRON HYDROCHLORIDE 4 MG: 2 INJECTION, SOLUTION INTRAMUSCULAR; INTRAVENOUS at 11:40

## 2022-12-08 RX ADMIN — DOCUSATE SODIUM 100 MG: 100 CAPSULE, LIQUID FILLED ORAL at 17:21

## 2022-12-08 RX ADMIN — FENTANYL CITRATE 25 MCG: 50 INJECTION, SOLUTION INTRAMUSCULAR; INTRAVENOUS at 13:58

## 2022-12-08 RX ADMIN — ACETAMINOPHEN 650 MG: 325 TABLET ORAL at 09:37

## 2022-12-08 RX ADMIN — FENTANYL CITRATE 25 MCG: 50 INJECTION, SOLUTION INTRAMUSCULAR; INTRAVENOUS at 14:30

## 2022-12-08 RX ADMIN — Medication 50 MG/HR: at 11:45

## 2022-12-08 RX ADMIN — ROCURONIUM BROMIDE 10 MG: 10 SOLUTION INTRAVENOUS at 11:32

## 2022-12-08 RX ADMIN — ALBUMIN (HUMAN) 12.5 G: 12.5 INJECTION, SOLUTION INTRAVENOUS at 13:25

## 2022-12-08 RX ADMIN — KETOROLAC TROMETHAMINE 15 MG: 30 INJECTION, SOLUTION INTRAMUSCULAR; INTRAVENOUS at 20:20

## 2022-12-08 RX ADMIN — FENTANYL CITRATE 25 MCG: 50 INJECTION, SOLUTION INTRAMUSCULAR; INTRAVENOUS at 13:10

## 2022-12-08 RX ADMIN — LIDOCAINE HYDROCHLORIDE 0.1 ML: 10 INJECTION, SOLUTION EPIDURAL; INFILTRATION; INTRACAUDAL; PERINEURAL at 09:32

## 2022-12-08 RX ADMIN — DEXMEDETOMIDINE HYDROCHLORIDE 0.4 MCG/KG/HR: 100 INJECTION, SOLUTION, CONCENTRATE INTRAVENOUS at 11:45

## 2022-12-08 RX ADMIN — DIPHENHYDRAMINE HYDROCHLORIDE 12.5 MG: 50 INJECTION, SOLUTION INTRAMUSCULAR; INTRAVENOUS at 14:00

## 2022-12-08 RX ADMIN — SODIUM CHLORIDE, POTASSIUM CHLORIDE, SODIUM LACTATE AND CALCIUM CHLORIDE 1000 ML: 600; 310; 30; 20 INJECTION, SOLUTION INTRAVENOUS at 09:33

## 2022-12-08 RX ADMIN — SUCCINYLCHOLINE CHLORIDE 140 MG: 20 INJECTION, SOLUTION INTRAMUSCULAR; INTRAVENOUS at 11:32

## 2022-12-08 RX ADMIN — EPHEDRINE SULFATE 10 MG: 50 INJECTION INTRAVENOUS at 13:54

## 2022-12-08 RX ADMIN — MIDAZOLAM 2 MG: 1 INJECTION INTRAMUSCULAR; INTRAVENOUS at 11:26

## 2022-12-08 RX ADMIN — ACETAMINOPHEN 650 MG: 325 TABLET ORAL at 23:17

## 2022-12-08 RX ADMIN — FENTANYL CITRATE 25 MCG: 50 INJECTION INTRAMUSCULAR; INTRAVENOUS at 11:40

## 2022-12-08 RX ADMIN — FENTANYL CITRATE 50 MCG: 50 INJECTION INTRAMUSCULAR; INTRAVENOUS at 12:00

## 2022-12-08 RX ADMIN — NORETHINDRONE AND ETHINYL ESTRADIOL 10 MG: KIT ORAL at 13:54

## 2022-12-08 RX ADMIN — FENTANYL CITRATE 25 MCG: 50 INJECTION, SOLUTION INTRAMUSCULAR; INTRAVENOUS at 13:40

## 2022-12-08 RX ADMIN — SODIUM CHLORIDE, PRESERVATIVE FREE 5 ML: 5 INJECTION INTRAVENOUS at 22:00

## 2022-12-08 RX ADMIN — SODIUM CHLORIDE 75 ML/HR: 9 INJECTION, SOLUTION INTRAVENOUS at 14:31

## 2022-12-08 RX ADMIN — HYDROMORPHONE HYDROCHLORIDE 0.2 MG: 1 INJECTION, SOLUTION INTRAMUSCULAR; INTRAVENOUS; SUBCUTANEOUS at 14:57

## 2022-12-08 RX ADMIN — GLYCOPYRROLATE 0.4 MG: 0.2 INJECTION INTRAMUSCULAR; INTRAVENOUS at 12:42

## 2022-12-08 RX ADMIN — MAGNESIUM SULFATE 2 G: 2 INJECTION INTRAVENOUS at 11:45

## 2022-12-08 RX ADMIN — Medication 2 MG: at 12:42

## 2022-12-08 RX ADMIN — CEFOXITIN SODIUM 2 G: 2 POWDER, FOR SOLUTION INTRAVENOUS at 11:45

## 2022-12-08 RX ADMIN — KETOROLAC TROMETHAMINE 15 MG: 30 INJECTION, SOLUTION INTRAMUSCULAR; INTRAVENOUS at 14:57

## 2022-12-08 RX ADMIN — DIPHENHYDRAMINE HYDROCHLORIDE 12.5 MG: 50 INJECTION, SOLUTION INTRAMUSCULAR; INTRAVENOUS at 13:48

## 2022-12-08 RX ADMIN — ALBUMIN (HUMAN) 12.5 G: 12.5 INJECTION, SOLUTION INTRAVENOUS at 13:45

## 2022-12-08 RX ADMIN — HYDROCORTISONE SODIUM SUCCINATE 100 MG: 100 INJECTION, POWDER, FOR SOLUTION INTRAMUSCULAR; INTRAVENOUS at 14:27

## 2022-12-08 RX ADMIN — ROCURONIUM BROMIDE 20 MG: 10 SOLUTION INTRAVENOUS at 11:40

## 2022-12-08 RX ADMIN — SODIUM CHLORIDE, POTASSIUM CHLORIDE, SODIUM LACTATE AND CALCIUM CHLORIDE: 600; 310; 30; 20 INJECTION, SOLUTION INTRAVENOUS at 11:26

## 2022-12-08 NOTE — PROGRESS NOTES
12/08/22 1202   Family Communication   Family Update Message Procedure started   Delivery Origin Nurse    Relationship to Patient Spouse    Phone Number Jessica Zavaletatammie [phone]   Family/Significant Other Update Called

## 2022-12-08 NOTE — DISCHARGE INSTRUCTIONS
[address] department of [address], [address], [address]  P [phone]  F [phone]       Fransisco Little      Dear Ms. Jazmyne Santiago,      Please review your instructions with your nurse and ask any questions so you have all the information you need to recover well at home. If you do not feel you have everything you need to succeed and be safe after you leave the hospital, please discuss these concerns with your nurse. As always, call for any questions at home. Take Home Medications     See Discharge Medication Review provided to you by your nurse. If you did not receive one, request this prior to your discharge. It is important that you take your medications as they are prescribed. Your prescriptions are sent to your pharmacy on record. Keep your medications in the bottles provided by the pharmacist and keep a list of the medication names, dosages, times to be taken and what they are for in your wallet. Do not take other medications without consulting your doctor. If you are prescribed enoxaparin (Lovenox) or Apixaban (Eliquis) following your surgery and are taking a baby aspirin daily, please stop taking the Aspirin until your course of enoxaparin/Eliquis is completed. You may take Tylenol and Ibuprofen or Aleve for pain. If this is not sufficient to control your pain, Tramadol or another pain medication will be prescribed for you. You should take a daily gentle stool softener such as a colace pill or dulcolax suppository for constipation as this is not uncommon after surgery and/or while on pain medication. If not prescribed, this can be found over the counter. If constipation persists for >24 hours you should take a dose of Milk of Magnesia. Call if your constipation continues. Diet    Stay hydrated and drink fluids such as gatorade and water.  This will also help prevent constipation and dehydration. Limit any usual caffeine intake such as soda, tea and coffee as these may serve to dehydrate you. For the first 2-3 days following your procedure, keep a low fiber diet avoiding raw vegetables or fruits with skin. Choose a diet consisting of soup, cereal, yogurt, eggs, fish, Boost/Ensure. Avoid fatty/greasy foods. If nauseated, keep your diet limited to liquids and call if this persists. Activity    If possible, have someone with you at all times until you feel stable. Gradually increase your activity each day. There are generally no restrictions on walking, climbing stairs or riding in a car. Walk at least 4 times per day. Walking will help reduce the risk of blood blots, constipation and pneumonia. Adron Rucks are okay. If you have an incision, no tub bathing/swimming for 3-4 weeks. No swimming or other submerging under water for the same amount of time. No driving for 2 week and/or while on pain medication. If you had surgery, the following restrictions are in place:  No heavy lifting greater than 10 lbs for the first 3 weeks following surgery and then no more than 25 lbs for the next 3 weeks. If you had a hysterectomy, nothing per vagina for 6-8 weeks. You may experience vaginal spotting/discharge following your procedure. Should the bleeding require more than 4 pads a day, please call the office. Incision    You should expect some discomfort in the area of your incision, particularly as you increase your activity. If you notice an area of increasing redness or new drainage, please call your doctor. Staples are generally removed in 10-14 days. Many incisions will have buried absorbable sutures, which do not need to be removed and are covered by protective glue. Please allow this glue to come off on its own.         Causes For Concern    If any of the following occur, please call our office and speak with the Nurse/aid who will help you with your problem or ask the doctor to call you. Problems with the incision, including increasing pain, swelling, redness or drainage. Inability to pass urine   Increasing abdominal pain despite medication  Persisting nausea or vomiting. Fever or chills and a temperature >101F  Constipation (no bowel movement for three days). Diarrhea (more than three watery stools within 24 hours). Excessive vaginal or wound bleeding. If after hours and you cannot reach an on-call physician, call 911. Follow-Up    Call [phone] to schedule the following appointments with Dr. Jose Juan Jerez:  Telephone virtual-visit in 10-14 days to discuss your pathology results. In-office visit for your postoperative exam in 6 weeks from surgery. Information obtained by :  I understand that if any problems occur once I am at home I am to contact my physician. I understand and acknowledge receipt of the instructions indicated above.                                                                                                                                            Physician's or R.N.'s Signature                                                                  Date/Time                                                                                                                                              Patient or Representative Signature                                                          Date/Time

## 2022-12-08 NOTE — ANESTHESIA POSTPROCEDURE EVALUATION
Post-Anesthesia Evaluation and Assessment    Patient: Anshu Espinal MRN: [id number]  SSN: [id number]    YOB: 1960  Age: 60 y.o. Sex: female      I have evaluated the patient and they are stable and ready for discharge from the PACU. Cardiovascular Function/Vital Signs  Visit Vitals  /64   Pulse 64   Temp 36.7 °C (98 °F)   Resp 15   Ht 5' (1.524 m)   Wt 53.5 kg (117 lb 15.1 oz)   SpO2 97%   BMI 23.03 kg/m²       Patient is status post General anesthesia for Procedure(s):  TOTAL LAPAROSCOPIC HYSTERECTOMY, BILATERAL SALPINGO-OOPHORECTOMY. Nausea/Vomiting: None    Postoperative hydration reviewed and adequate. Pain:  Pain Scale 1: Numeric (0 - 10) (12/08/22 1457)  Pain Intensity 1: 8 (12/08/22 1457)   Managed    Neurological Status:   Neuro (WDL): Within Defined Limits (12/08/22 1457)   At baseline    Mental Status, Level of Consciousness: Alert and  oriented to person, place, and time    Pulmonary Status:   O2 Device: None (12/08/22 1458)   Adequate oxygenation and airway patent    Complications related to anesthesia: None    Post-anesthesia assessment completed. No concerns    Signed By: Javier Tineo MD     December 8, 2022              Procedure(s):  TOTAL LAPAROSCOPIC HYSTERECTOMY, BILATERAL SALPINGO-OOPHORECTOMY. general    <BSHSIANPOST>    INITIAL Post-op Vital signs:   Vitals Value Taken Time   /62 12/08/22 1515   Temp 36.4 °C (97.6 °F) 12/08/22 1510   Pulse 86 12/08/22 1524   Resp 22 12/08/22 1524   SpO2 93 % 12/08/22 1524   Vitals shown include unvalidated device data.

## 2022-12-08 NOTE — OP NOTES
Gynecologic Oncology Operative Report    Edgardo Mcintosh  12/8/2022    PREOPERATIVE DIAGNOSIS:  1) Cervical AIS    POSTOPERATIVE DIAGNOSIS:  1) Cervical AIS    PROCEDURE:    Total laparoscopic hysterectomy with bilateral salpingo-oophorectomy    Surgeon:  Giancarlo Delgado MD    Assistant: Nataly Carbajal surgical assistance was required throughout the case due to the complexity of the procedure. Shee assisted with dissection, development of the retroperitoneal spaces, and identification of pertinent anatomy during the procedure. Anesthesia:  General endotracheal anesthesia    EBL:  <10 cc     Preoperative antibiotics: Cefotetan 2grams     VTE Prophylaxis: SCDs     Complications:  None    Implants: none    Specimens:  Uterus, cervix, bilateral fallopian tubes and ovaries    Operative indications:  60 y.o. female with cervical AIS s/p [facility]     Operative findings: On laparoscopic survey, normal appearing uterus, bilateral tubes/ovaries. Normal appearing rectosigmoid colon, small bowel, omentum, liver edge, and diaphragm. Operative note:  After the risks, benefits, indications, and alternatives of the procedure were discussed with the patient and informed consent was obtained, the patient was taken to the operating room. She was positively identified, administered general anesthesia, and then placed in the dorsal lithotomy position in [address] [place] [address]. An exam under anesthesia was performed with the above findings. She was then prepped and draped in the usual fashion. A esparza catheter was inserted. A speculum was placed in the vagina and the anterior lip of the cervix was grasped with a tenaculum and then dilated. Then an advincula dilineator uterine manipulator was placed without difficulty. A 09FJ umbilical incision was then made with #15-blade and carried down to the underlying fascia. The fascia was incised and the peritoneal cavity entered via an open Ruchi technique.  10-mm balloon trocar was then placed and intra-peritoneal placement confirmed via direct visualization. The abdomen was then insufflated with CO2 to a pressure of 15mmHg. The abdomen was surveyed with the above findings. Bilateral right and left lower quadrant 5-mm trocars were then inserted under direct visualization. The patient was placed in Trendelenburg. Attention was then turned to the pelvis. Bilateral round ligaments were sealed and divided with the EnSeal device and the bilateral pelvic side-wall retroperitoneum entered and developed. Bilateral ureters were identified and preserved. Bilateral infundibulopelvic ligaments were skeletonized, then sealed and divided with the EnSeal device. Dissection was continued inferiorly along the broad ligament and the bladder dissected off the lower uterine segment and cervix. Bilateral uterine arteries were skeletonized, then sealed and divided with the EnSeal device. A circumferential colpotomy was then carried along the uterine manipulator. The specimen was then removed via the vagina and sent for permanent pathology. The vaginal colpotomy was closed using the EndoStitch with 0-Vicryl V-lock suture. The pelvis was irrigated and made hemostatic. The intra-abdominal pressure was then decreased and all planes of dissection were confirmed hemostatic. Fibrillar was placed along all planes of dissection. The insufflation was released prior to removal of all port sites, then all trocars were removed. The umbilical fascia was reapproximated with a figure-of-8 stitch using 0-Vicryl on a UR-6 needle. All skin incisions were closed with 4-0 monocryl subcuticular stitch. Skin glue was applied to all skin incisions. The vagina was inspected with an intact vaginal cuff and no evidence of lacerations. The patient was taken out of [facility], awakened from anesthesia, and taken to the recovery room in stable condition. The patient tolerated the procedure well.  All instrument, sponge, and needle counts were correct.         Baljit Ramachandran MD  12/8/2022  1:04 PM

## 2022-12-08 NOTE — LETTER
NOTIFICATION RETURN TO WORK / SCHOOL    12/8/2022 3:00 PM    Ms. Dago [address] [address]      To Whom It May Concern:    Asim Mahajan is currently under the care of Marcell Worthington. She had laparoscopic surgery with Dr. Jose Juan Jerez on 12/8/22. She will be unable to perform all job functions due to post operative recovery from 12/8/22 - 1/19/2023. She will return to work on 1/20/2023. If there are questions or concerns please have the patient contact our office.         Sincerely,      Casey Robert MD

## 2022-12-08 NOTE — BRIEF OP NOTE
Brief Postoperative Note    Patient: Margot Samayoa  YOB: 1960  MRN: [id number]    Date of Procedure: 12/8/2022     Pre-Op Diagnosis: CERVICAL AIS    Post-Op Diagnosis: Same as preoperative diagnosis. Procedure(s):  TOTAL LAPAROSCOPIC HYSTERECTOMY, BILATERAL SALPINGO-OOPHORECTOMY    Surgeon(s): Jennifer Layton MD    Surgical Assistant: None    Anesthesia: General     Estimated Blood Loss (mL): Minimal    Complications: None    Specimens:   ID Type Source Tests Collected by Time Destination   1 : UTERUS, CERVIX, BILATERAL TUBES AND OVARIES Fresh Uterus with Bilateral Fallopian tubes and Ovaries  Jennifer Layton MD 12/8/2022 1227 Pathology        Implants: * No implants in log *    Drains: * No LDAs found *    Findings: On laparoscopic survey, normal appearing uterus, bilateral tubes/ovaries. Normal appearing rectosigmoid colon, small bowel, omentum, liver edge, and diaphragm.      Electronically Signed by Sushila Faye MD on 12/8/2022 at 1:02 PM

## 2022-12-08 NOTE — PERIOP NOTES
TRANSFER - OUT REPORT:    Verbal report given to bella(name) on Ida Potter  being transferred to [facility](unit) for routine post - op       Report consisted of patients Situation, Background, Assessment and   Recommendations(SBAR). Time Pre op antibiotic given:1200Anesthesia Stop time:1300  Information from the following report(s) Procedure Summary and Accordion was reviewed with the receiving nurse. Opportunity for questions and clarification was provided. Is the patient on 02? NO             Is the patient on a monitor? NO    Is the nurse transporting with the patient? NO    Surgical Waiting Area notified of patient's transfer from PACU?  NOcalled husbandd on ceell phone, gave him an update and room number      The following personal items collected during your admission accompanied patient upon transfer:   Dental Appliance: Dental Appliances: None  Vision:    Hearing Aid:    Jewelry:    Clothing: Clothing:  (clothing bag, glasses to pacu)  Other Valuables:    Valuables sent to safe:     Glasses ans 1 bag of clothing returned to pt in pacu

## 2022-12-08 NOTE — PROGRESS NOTES
TRANSFER - IN REPORT:    Verbal report received from L. Gerhardt Portugal, RN (name) on Marilyn Quan  being received from PACU (unit) for routine post - op      Report consisted of patients Situation, Background, Assessment and   Recommendations(SBAR). Information from the following report(s) SBAR, Kardex, OR Summary, Procedure Summary, Intake/Output, MAR, Accordion, Recent Results, and Med Rec Status was reviewed with the receiving nurse. Opportunity for questions and clarification was provided. Assessment completed upon patients arrival to unit and care assumed.

## 2022-12-08 NOTE — PROGRESS NOTES
TRANSFER - IN REPORT:    Verbal report received from DEREK Yuan RN (name) on Quincy Cotto  being received from [employer]) for routine post - op      Report consisted of patients Situation, Background, Assessment and   Recommendations(SBAR). Information from the following report(s) SBAR, Kardex, OR Summary, Procedure Summary, Intake/Output, MAR, Accordion, and Recent Results was reviewed with the receiving nurse. Opportunity for questions and clarification was provided. Assessment completed upon patients arrival to unit and care assumed.

## 2022-12-08 NOTE — ROUTINE PROCESS
Patient: Janusz Noriega MRN: [id number]  SSN: [id number]   YOB: 1960  Age: 60 y.o. Sex: female     Patient is status post Procedure(s):  TOTAL LAPAROSCOPIC HYSTERECTOMY, BILATERAL SALPINGO-OOPHORECTOMY.     Surgeon(s) and Role:     * Miquel Segura MD - Primary    Local/Dose/Irrigation:  30ml marcaine 0.5%                  Peripheral IV 12/08/22 Distal;Posterior;Right Forearm (Active)   Site Assessment Clean, dry, & intact 12/08/22 0923   Phlebitis Assessment 0 12/08/22 0923   Infiltration Assessment 0 12/08/22 0923   Dressing Status Clean, dry, & intact 12/08/22 0923   Dressing Type Transparent 12/08/22 0923   Hub Color/Line Status Infusing 12/08/22 0923            Airway - Endotracheal Tube 12/08/22 Oral (Active)                   Dressing/Packing:  Incision 12/08/22 Abdomen-Dressing/Treatment: Skin glue (12/08/22 1242)  Incision 12/08/22 Vagina-Dressing/Treatment: Redgie Keys (12/08/22 1242)    Splint/Cast:  ]    Other:  NA

## 2022-12-08 NOTE — INTERVAL H&P NOTE
Date of Surgery Update:  Anshu Espinal was seen and examined. History and physical has been reviewed. The patient has been examined.  There have been no significant clinical changes since the completion of the originally dated History and Physical.    Signed By: Francesca Sharif MD     December 8, 2022 10:37 AM

## 2022-12-08 NOTE — ANESTHESIA PREPROCEDURE EVALUATION
Relevant Problems   No relevant active problems       Anesthetic History   No history of anesthetic complications            Review of Systems / Medical History  Patient summary reviewed, nursing notes reviewed and pertinent labs reviewed    Pulmonary  Within defined limits                 Neuro/Psych   Within defined limits           Cardiovascular  Within defined limits                     GI/Hepatic/Renal  Within defined limits              Endo/Other      Hypothyroidism: well controlled       Other Findings              Physical Exam    Airway  Mallampati: II  TM Distance: > 6 cm  Neck ROM: normal range of motion   Mouth opening: Normal     Cardiovascular  Regular rate and rhythm,  S1 and S2 normal,  no murmur, click, rub, or gallop             Dental  No notable dental hx       Pulmonary  Breath sounds clear to auscultation               Abdominal  GI exam deferred       Other Findings            Anesthetic Plan    ASA: 2  Anesthesia type: general          Induction: Intravenous  Anesthetic plan and risks discussed with: Patient

## 2022-12-09 VITALS
WEIGHT: 117.95 LBS | TEMPERATURE: 98.3 F | HEIGHT: 60 IN | DIASTOLIC BLOOD PRESSURE: 75 MMHG | SYSTOLIC BLOOD PRESSURE: 123 MMHG | HEART RATE: 80 BPM | RESPIRATION RATE: 15 BRPM | OXYGEN SATURATION: 98 % | BODY MASS INDEX: 23.16 KG/M2

## 2022-12-09 LAB
ANION GAP SERPL CALC-SCNC: 5 MMOL/L (ref 5–15)
BUN SERPL-MCNC: 19 MG/DL (ref 6–20)
BUN/CREAT SERPL: 24 (ref 12–20)
CALCIUM SERPL-MCNC: 8.5 MG/DL (ref 8.5–10.1)
CHLORIDE SERPL-SCNC: 112 MMOL/L (ref 97–108)
CO2 SERPL-SCNC: 23 MMOL/L (ref 21–32)
CREAT SERPL-MCNC: 0.79 MG/DL (ref 0.55–1.02)
ERYTHROCYTE [DISTWIDTH] IN BLOOD BY AUTOMATED COUNT: 12.2 % (ref 11.5–14.5)
GLUCOSE SERPL-MCNC: 145 MG/DL (ref 65–100)
HCT VFR BLD AUTO: 32.6 % (ref 35–47)
HGB BLD-MCNC: 11.1 G/DL (ref 11.5–16)
MCH RBC QN AUTO: 31.5 PG (ref 26–34)
MCHC RBC AUTO-ENTMCNC: 34 G/DL (ref 30–36.5)
MCV RBC AUTO: 92.6 FL (ref 80–99)
NRBC # BLD: 0 K/UL (ref 0–0.01)
NRBC BLD-RTO: 0 PER 100 WBC
PLATELET # BLD AUTO: 144 K/UL (ref 150–400)
PMV BLD AUTO: 11.5 FL (ref 8.9–12.9)
POTASSIUM SERPL-SCNC: 4.6 MMOL/L (ref 3.5–5.1)
RBC # BLD AUTO: 3.52 M/UL (ref 3.8–5.2)
SODIUM SERPL-SCNC: 140 MMOL/L (ref 136–145)
WBC # BLD AUTO: 10 K/UL (ref 3.6–11)

## 2022-12-09 PROCEDURE — 74011250636 HC RX REV CODE- 250/636: Performed by: PHYSICIAN ASSISTANT

## 2022-12-09 PROCEDURE — 74011250637 HC RX REV CODE- 250/637: Performed by: PHYSICIAN ASSISTANT

## 2022-12-09 PROCEDURE — 36415 COLL VENOUS BLD VENIPUNCTURE: CPT

## 2022-12-09 PROCEDURE — G0378 HOSPITAL OBSERVATION PER HR: HCPCS

## 2022-12-09 PROCEDURE — 85027 COMPLETE CBC AUTOMATED: CPT

## 2022-12-09 PROCEDURE — 80048 BASIC METABOLIC PNL TOTAL CA: CPT

## 2022-12-09 PROCEDURE — 96374 THER/PROPH/DIAG INJ IV PUSH: CPT

## 2022-12-09 RX ADMIN — KETOROLAC TROMETHAMINE 15 MG: 30 INJECTION, SOLUTION INTRAMUSCULAR; INTRAVENOUS at 03:25

## 2022-12-09 RX ADMIN — LEVOTHYROXINE SODIUM 50 MCG: 0.05 TABLET ORAL at 06:51

## 2022-12-09 RX ADMIN — DOCUSATE SODIUM 100 MG: 100 CAPSULE, LIQUID FILLED ORAL at 08:24

## 2022-12-09 RX ADMIN — FAMOTIDINE 20 MG: 20 TABLET, FILM COATED ORAL at 08:24

## 2022-12-09 RX ADMIN — ACETAMINOPHEN 650 MG: 325 TABLET ORAL at 06:51

## 2022-12-09 NOTE — PROGRESS NOTES
[address] department of [address], [address], [address]  LAW [phone]  F [phone]       Patient Name: Refugio Moyer Date: 12/8/2022   OR Date: 12/8/2022   Visit Date: 12/9/2022        SUBJECTIVE:  Feeling well this morning. Much better than yesterday. Developed an itchy rash/hives over her back and abdomen following surgery while she was in the recovery area. No shortness of breath. No trouble swallowing. Symptoms improved with anti-histamines. Other than the hives, patient doing well. Has been up walking. Tolerating diet. Pain controlled.      OBJECTIVE:    Patient Vitals for the past 24 hrs:   Temp Pulse Resp BP SpO2   12/09/22 0810 98.3 °F (36.8 °C) 80 15 123/75 98 %   12/09/22 0400 98.5 °F (36.9 °C) 64 14 105/65 95 %   12/09/22 0021 98.1 °F (36.7 °C) 75 14 96/63 95 %   12/08/22 2019 98.4 °F (36.9 °C) 87 15 112/68 96 %   12/08/22 1856 98.4 °F (36.9 °C) 84 15 (!) 105/58 97 %   12/08/22 1753 98.3 °F (36.8 °C) 79 16 106/64 99 %   12/08/22 1649 98.2 °F (36.8 °C) 82 16 124/73 --   12/08/22 1539 98 °F (36.7 °C) 64 15 119/64 97 %   12/08/22 1515 -- 71 16 127/62 98 %   12/08/22 1510 97.6 °F (36.4 °C) -- -- -- --   12/08/22 1500 -- 62 17 (!) 121/57 97 %   12/08/22 1458 -- 76 16 (!) 121/57 99 %   12/08/22 1445 -- 75 16 (!) 104/58 93 %   12/08/22 1430 -- 61 17 (!) 107/56 99 %   12/08/22 1415 -- 83 21 (!) 110/52 99 %   12/08/22 1400 -- 71 15 (!) 126/57 96 %   12/08/22 1359 -- 72 18 (!) 126/57 94 %   12/08/22 1345 -- 60 17 (!) 125/56 100 %   12/08/22 1340 -- 69 18 -- 100 %   12/08/22 1335 -- (!) 58 16 125/69 99 %   12/08/22 1330 -- (!) 46 11 (!) 85/49 98 %   12/08/22 1325 -- (!) 46 19 (!) 95/58 99 %   12/08/22 1320 -- (!) 46 10 (!) 100/58 100 %   12/08/22 1315 -- (!) 47 15 (!) 68/44 99 %   12/08/22 1311 97.6 °F (36.4 °C) (!) 50 12 (!) 102/52 100 %   12/08/22 1310 -- (!) 49 -- (!) 102/52 100 %   12/08/22 0908 98.7 °F (37.1 °C) 75 12 137/64 98 %       Date 12/08/22 0700 - 12/09/22 0659 12/09/22 0700 - 12/10/22 0659   Shift [id number] 1900-0659 24 Hour Total 9349-5911 2760-0622 24 Hour Total   INTAKE   Shift Total(mL/kg)         OUTPUT   Urine(mL/kg/hr) 600(0.9) 1000(1.6) 1600(1.2)        Urine Voided  1000 1000        Urine Output (mL) ([REMOVED] Urinary Catheter 12/08/22 Heck) 600  600      Shift Total(mL/kg) 600(11.2) 1000(18.7) 1600(29.9)      NET -600 -1000 -1600      Weight (kg) 53.5 53.5 53.5 53.5 53.5 53.5       Physical Exam     General:  alert, cooperative, no distress     Cardiac:  Regular rate and rhythm        Lungs:  clear to auscultation bilaterally  Abdomen:  soft, nondistended, nontender       Wound:  clean, dry   Extremity: extremities normal, atraumatic, no cyanosis or edema    Data Review  Lab Results   Component Value Date/Time    WBC 10.0 12/09/2022 03:27 AM    ABS. NEUTROPHILS 2.7 10/21/2022 08:13 AM    HGB 11.1 (L) 12/09/2022 03:27 AM    HCT 32.6 (L) 12/09/2022 03:27 AM    MCV 92.6 12/09/2022 03:27 AM    MCH 31.5 12/09/2022 03:27 AM    PLATELET 572 (L) 21/52/2986 03:27 AM     Lab Results   Component Value Date/Time    Sodium 140 12/09/2022 03:27 AM    Potassium 4.6 12/09/2022 03:27 AM    Chloride 112 (H) 12/09/2022 03:27 AM    CO2 23 12/09/2022 03:27 AM    Glucose 145 (H) 12/09/2022 03:27 AM    BUN 19 12/09/2022 03:27 AM    Creatinine 0.79 12/09/2022 03:27 AM    Calcium 8.5 12/09/2022 03:27 AM    Albumin 4.1 10/21/2022 08:13 AM    Bilirubin, total 0.4 10/21/2022 08:13 AM    ALT (SGPT) 26 10/21/2022 08:13 AM    Alk. phosphatase 54 10/21/2022 08:13 AM     IMPRESSION/PLAN:    1 Day Post-Op Procedure(s):  TOTAL LAPAROSCOPIC HYSTERECTOMY, BILATERAL SALPINGO-OOPHORECTOMY for MADISON INSTU II    Oncologic:   60 y.o. female with cervical AIS s/p CKC   Heme/CV:  Hgb 11.1.  VSS. Hemodynamically stable. Renal:  Creatinine 0.79. good urine output overnight. Heck removed yesterday. Has been voiding with no problems. FEN/GI:  No nausea. Tolerating regular diet. ID/Wound:  Afebrile. Incisions and abdomen benign. PPX:  SCDs. Incentive spirometer. Continue OOB   Dispostion:  Doing well postoperatively. Continue routine post op care. Discharge instructions reviewed with patient, her daughter and her . Questions answered. Ready for discharge.          Darlene Egan PA-C  12/9/2022  9:02 AM

## 2022-12-09 NOTE — PROGRESS NOTES
Bedside and Verbal shift change report given to [employer] (oncoming nurse) by Angie Lama (offgoing nurse). Report included the following information SBAR, Intake/Output, MAR, Accordion, and Recent Results. I have reviewed discharge instructions with the patient. The patient verbalized understanding.

## 2022-12-09 NOTE — PROGRESS NOTES
Bedside and Verbal shift change report given to [id number], [address]  (oncoming nurse) by Kassandra Ferguson RN (offgoing nurse). Report included the following information SBAR, Kardex, and MAR.

## 2022-12-19 NOTE — PROGRESS NOTES
[facility]  [address] [facility], [address], [address]  P [phone]  F [phone]    Office Note  Patient ID:  Name:  Earl Vivas  MRN:  [id number]  :  1960/62 y.o. Date:  2022      HISTORY OF PRESENT ILLNESS:  Ms. Earl Vivas is a 60 y.o. female who presented with cervical AIS. On 10/28/2022 underwent Cold knife conization of cervix, Endocervical curettage. Final pathology consistent with AIS, but with positive margins. On 2022 underwent Total laparoscopic hysterectomy with bilateral salpingo-oophorectomy. Final pathology consistent with AIS, negative margins; no invasive cancer. Presents today via virtual visit to discuss pathology. Doing well since surgery. Initial History:  Ms. Earl Vivas is a 60 y.o. female who presents as a new patient from Dr. Rashard Riggs for cervical AIS. Pap smear was normal, but HR HPV+, 18/45. Colposcopy with ECC demonstrated AIS. Referred to our office for further discussion and management. Denies vaginal bleeding or discharge. Denies all other complaints. Denies prior abnormal pap smears. Pathology Review:   2022:  * * *FINAL PATHOLOGIC DIAGNOSIS* * *   Uterus with cervix, ovaries and fallopian tubes, hysterectomy, bilateral   salpingectomy and oophorectomy:        Endocervical adenocarcinoma in situ, HPV associated (usual type). Ectocervix with features consistent with previous conization (ulceration,   necrosis, and granulation tissue). Viable squamous mucosa not identified. Benign basal endometrium, negative for hyperplasia and malignancy. Myometrium with no histological abnormalities. Bilateral ovaries with benign cystic follicles. Benign bilateral fallopian tubes. 10/28/2022:  1. Cervix, conization:        Adenocarcinoma in situ, present at the inked tissue edge (see   Comment).    2. Endocervix, curettage:        Scant atypical endocervical glands, suspicious for at least adenocarcinoma in situ (see Comment).   >>>>>  * * *Comment* * *   <<<<<  Focal adenocarcinoma in situ is present and is seen at the green and blue   inked anterior and posterior deep endocervical margins. Immunohistochemical staining for p16 shows diffuse and strong focal   staining of endocervical glands. The findings are suspicious for at least   adenocarcinoma in situ. Control stains appropriately. 9/7/2022:  * * *FINAL PATHOLOGIC DIAGNOSIS* * *   Endocervical curettage:        Adenocarcinoma in situ (at least), see comment. * * *Comment* * *   The nuclear and architectural features are compatible with at least   adenocarcinoma in situ. The tissue is fragmented and limited, precluding   further evaluation to exclude an invasive tumor. Immunohistochemical   stains (p16, p53) were performed on block 1A with appropriately reactive   controls. The p16 is diffusely positive with scattered p53 positivity. While this favors endocervical origin, correlation with clinical and   radiographic impression is suggested to definitively characterize the   primary site of this malignancy. Clinical correlation is recommended. This case was shown to one or more pathologists for consultation, and they   concur with the diagnosis. ROS:  A comprehensive review of systems was negative except for that written in the History of Present Illness.  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    Bruit of left carotid artery 10/01/2018    H/O colonoscopy 01/29/2018    Mitral regurgitation 01/29/2018    Vitamin D insufficiency 01/29/2018    Hashimoto's disease 12/01/2017    Celiac disease 12/01/2017    Generalized anxiety disorder 12/01/2017    Osteoporosis 12/01/2017     PMH:  Past Medical History:   Diagnosis Date    Arrhythmia     MURMUR    Carcinoma in situ of cervix 2022    Celiac disease     SY (generalized anxiety disorder)     Hashimoto's disease     Mitral regurgitation     Osteoporosis     Osteoporosis       PSH:  Past Surgical History:   Procedure Laterality Date    COLONOSCOPY N/A 2019    COLONOSCOPY WTIH EMR performed by Hilary Crenshaw MD at [address] [place] [address] ENDOSCOPY    COLONOSCOPY N/A 2019    COLONOSCOPY performed by Hilary Crenshaw MD at [address] [place] [address] ENDOSCOPY    HX CATARACT REMOVAL      HX CERVICAL POLYPECTOMY  2018    removed - Dr. Georgia Blackwood    HX GI      COLONOSCOPY    HX GYN      tubal ligation    HX GYN  10/2022    COLD KNIFE    HX ORTHOPAEDIC Right 2021    ROTATOR CUFF REPAIR    HX OTHER SURGICAL  2017    Left Inguinal Node Biopsy    HX POLYPECTOMY        Social History:  Social History     Tobacco Use    Smoking status: Former     Packs/day: 1.00     Years: 28.00     Pack years: 28.00     Types: Cigarettes     Quit date:      Years since quittin.9    Smokeless tobacco: Never   Substance Use Topics    Alcohol use: No      Family History:  Family History   Problem Relation Age of Onset    OSTEOARTHRITIS Mother     Dementia Mother     Cancer Father         gleoblastoma- brain    Breast Cancer Sister 58    Thyroid Disease Sister     Lupus Sister     Lupus Sister     Thyroid Disease Sister     Thyroid Disease Sister     No Known Problems Sister     Dementia Brother     No Known Problems Brother     Breast Cancer Maternal Grandmother     Diabetes Maternal Grandmother     Cancer Maternal Grandfather         liver    No Known Problems Paternal Grandmother     No Known Problems Paternal Grandfather     Other Daughter         PCOS    Anesth Problems Neg Hx       Medications: (reviewed)  Current Outpatient Medications   Medication Sig    docusate sodium (COLACE) 100 mg capsule Take 1 Capsule by mouth daily for 30 days. Stop taking if you develop loose stools    levothyroxine (SYNTHROID) 50 mcg tablet Take  by mouth Daily (before breakfast).  ON Monday, Tuesday, Wednesday, Thursday,Friday AND Sunday    levothyroxine (SYNTHROID) 50 mcg tablet Take 100 mcg by mouth Daily (before breakfast). ON SATURDAY    Omega-3 Fatty Acids 60- mg cpDR Take  by mouth daily. ascorbic acid, vitamin C, (VITAMIN C) 500 mg tablet Take 500 mg by mouth daily. turmeric 400 mg cap Take  by mouth daily. grape seed extract (GRAPE SEED PO) Take  by mouth daily. vitamin G-W-E-lutein-minerals (OCUVITE) tablet 1 Tablet daily. aspirin 81 mg chewable tablet Take 81 mg by mouth daily. calcium carbonate (CALCIUM 600 PO) Take 600 mg by mouth daily. mv-min/iron/folic/calcium/vitK (WOMEN'S MULTIVITAMIN PO) Take  by mouth. No current facility-administered medications for this visit. Allergies: (reviewed)  Allergies   Allergen Reactions    Penicillins Hives     Patient screened for any delayed non-IgE-mediated reaction to PCN. Patient notes the following:    No delayed non-IgE-mediated reaction to PCN             Cefoxitin Hives and Itching    Clindamycin Hives    Gluten Unknown (comments)    Wheat Unknown (comments)     Barely, rye (Celiac)          OBJECTIVE:  *deferred today given video-conference visit for ongoing COVID-19 pandemic*   Physical Exam:  VITAL SIGNS: There were no vitals filed for this visit. There is no height or weight on file to calculate BMI. GENERAL DARCY: Conversant, alert, oriented. No acute distress. HEENT: HEENT. No thyroid enlargement. No JVD. Neck: Supple without restrictions. RESPIRATORY: Clear to auscultation and percussion to the bases. No CVAT. CARDIOVASC: RRR without murmur/rub. GASTROINT: soft, non-tender, without masses or organomegaly   MUSCULOSKEL: no joint tenderness, deformity or swelling       EXTREMITIES: extremities normal, atraumatic, no cyanosis or edema   PELVIC: Exam chaperoned by nurse. Normal appearing external genitalia. On speculum exam, normal appearing vagina and cervix.  On bimanual exam, the cervix and uterus are normal size and mobile. No evidence of adnexal masses or nodularity. RECTAL: deferred   PATY SURVEY: No suspicious lymphadenopathy or edema noted. NEURO: Grossly intact. No acute deficit. Lab Date as available:    Lab Results   Component Value Date/Time    WBC 10.0 12/09/2022 03:27 AM    HGB 11.1 (L) 12/09/2022 03:27 AM    HCT 32.6 (L) 12/09/2022 03:27 AM    PLATELET 442 (L) 19/04/1667 03:27 AM    MCV 92.6 12/09/2022 03:27 AM     Lab Results   Component Value Date/Time    Sodium 140 12/09/2022 03:27 AM    Potassium 4.6 12/09/2022 03:27 AM    Chloride 112 (H) 12/09/2022 03:27 AM    CO2 23 12/09/2022 03:27 AM    Anion gap 5 12/09/2022 03:27 AM    Glucose 145 (H) 12/09/2022 03:27 AM    BUN 19 12/09/2022 03:27 AM    Creatinine 0.79 12/09/2022 03:27 AM    BUN/Creatinine ratio 24 (H) 12/09/2022 03:27 AM    GFR est  01/18/2018 08:25 AM    GFR est non-AA 88 01/18/2018 08:25 AM    Calcium 8.5 12/09/2022 03:27 AM         IMPRESSION/PLAN:    Ms. Zena Vick is a 60 y.o. female who presented with cervical AIS. On 10/28/2022 underwent Cold knife conization of cervix, Endocervical curettage. Final pathology consistent with AIS, but with positive margins. On 12/8/2022 underwent Total laparoscopic hysterectomy with bilateral salpingo-oophorectomy. Final pathology consistent with AIS, negative margins; no invasive cancer. 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 No further treatment required. RTC in 4 weeks for postoperative visit.  Plan for q 6month surveillance at that time. All questions and concerns were addressed with the patient and she is comfortable with the plan. An electronic signature was used to authenticate this note. Christa Shahid MD    Pursuant to the emergency declaration unde the [facility] [place] [address] [facility] authority and the [employer] Act, this Virtual Visit was conducted, with patient's consent, to reduce the patient's risk of exposure to COVID-19 and provide continuity of care for an established patient. Patient identification was verified at the start of the visit: Yes    Services were provided through a video synchronous discussion virtually to substitute for in-person clinic visit. Patient was at her individual home, while the provider was in his/her respective office. I spent at least 15 minutes with this established patient, and >50% of the time was spent counseling and/or coordinating care regarding pathology review.     Christa Shahid MD

## 2022-12-20 NOTE — PROGRESS NOTES
Virtual Post op Visit to discuss pathology report, surgery was on 12/8/2022, pt reports no pain 0/10 on pain scale    1. Have you been to the ER, urgent care clinic since your last visit? Hospitalized since your last visit? Yes, surgery with Dr. Jose Gibson on 12/8/2022    2. Have you seen or consulted any other health care providers outside of the [address] [place] [address] since your last visit? Include any pap smears or colon screening.    no

## 2022-12-21 ENCOUNTER — VIRTUAL VISIT (OUTPATIENT)
Dept: GYNECOLOGY | Age: 62
End: 2022-12-21
Payer: COMMERCIAL

## 2022-12-21 DIAGNOSIS — D06.9 ADENOCARCINOMA IN SITU (AIS) OF UTERINE CERVIX: Primary | ICD-10-CM

## 2022-12-21 PROCEDURE — 99024 POSTOP FOLLOW-UP VISIT: CPT | Performed by: OBSTETRICS & GYNECOLOGY

## 2023-01-18 ENCOUNTER — OFFICE VISIT (OUTPATIENT)
Dept: GYNECOLOGY | Age: 63
End: 2023-01-18
Payer: COMMERCIAL

## 2023-01-18 VITALS
BODY MASS INDEX: 24.26 KG/M2 | SYSTOLIC BLOOD PRESSURE: 138 MMHG | HEIGHT: 60 IN | DIASTOLIC BLOOD PRESSURE: 74 MMHG | HEART RATE: 88 BPM | WEIGHT: 123.6 LBS

## 2023-01-18 DIAGNOSIS — D06.9 ADENOCARCINOMA IN SITU (AIS) OF UTERINE CERVIX: Primary | ICD-10-CM

## 2023-01-18 PROCEDURE — 99024 POSTOP FOLLOW-UP VISIT: CPT | Performed by: OBSTETRICS & GYNECOLOGY

## 2023-01-18 NOTE — PROGRESS NOTES
[facility]YN[facility]IC [facility]COL[facility]Y  [address] [facility], [address]  P [phone]  F [phone]    Office Note  Patient ID:  Name:  Kyaw Martins  MRN:  [id number]  :  1960/62 y.o. Date:  2023      HISTORY OF PRESENT ILLNESS:  Ms. Kyaw Martins is a 60 y.o. female who presented with cervical AIS. On 10/28/2022 underwent Cold knife conization of cervix, Endocervical curettage. Final pathology consistent with AIS, but with positive margins. On 2022 underwent Total laparoscopic hysterectomy with bilateral salpingo-oophorectomy. Final pathology consistent with AIS, negative margins; no invasive cancer. Presents today for postoperative visit. Initial History:  Ms. Kyaw Martins is a 60 y.o. female who presents as a new patient from Dr. Kate Jerome for cervical AIS. Pap smear was normal, but HR HPV+, 18/45. Colposcopy with ECC demonstrated AIS. Referred to our office for further discussion and management. Denies vaginal bleeding or discharge. Denies all other complaints. Denies prior abnormal pap smears. Pathology Review:   2022:  * * *FINAL PATHOLOGIC DIAGNOSIS* * *   Uterus with cervix, ovaries and fallopian tubes, hysterectomy, bilateral   salpingectomy and oophorectomy:        Endocervical adenocarcinoma in situ, HPV associated (usual type). Ectocervix with features consistent with previous conization (ulceration,   necrosis, and granulation tissue). Viable squamous mucosa not identified. Benign basal endometrium, negative for hyperplasia and malignancy. Myometrium with no histological abnormalities. Bilateral ovaries with benign cystic follicles. Benign bilateral fallopian tubes. 10/28/2022:  1. Cervix, conization:        Adenocarcinoma in situ, present at the inked tissue edge (see   Comment). 2. Endocervix, curettage:        Scant atypical endocervical glands, suspicious for at least   adenocarcinoma in situ (see Comment). >>>>>  * * *Comment* * *   <<<<<  Focal adenocarcinoma in situ is present and is seen at the green and blue   inked anterior and posterior deep endocervical margins. Immunohistochemical staining for p16 shows diffuse and strong focal   staining of endocervical glands. The findings are suspicious for at least   adenocarcinoma in situ. Control stains appropriately. 9/7/2022:  * * *FINAL PATHOLOGIC DIAGNOSIS* * *   Endocervical curettage:        Adenocarcinoma in situ (at least), see comment. * * *Comment* * *   The nuclear and architectural features are compatible with at least   adenocarcinoma in situ. The tissue is fragmented and limited, precluding   further evaluation to exclude an invasive tumor. Immunohistochemical   stains (p16, p53) were performed on block 1A with appropriately reactive   controls. The p16 is diffusely positive with scattered p53 positivity. While this favors endocervical origin, correlation with clinical and   radiographic impression is suggested to definitively characterize the   primary site of this malignancy. Clinical correlation is recommended. This case was shown to one or more pathologists for consultation, and they   concur with the diagnosis. ROS:  A comprehensive review of systems was negative except for that written in the History of Present Illness.  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    Bruit of left carotid artery 10/01/2018    H/O colonoscopy 01/29/2018    Mitral regurgitation 01/29/2018    Vitamin D insufficiency 01/29/2018    Hashimoto's disease 12/01/2017    Celiac disease 12/01/2017    Generalized anxiety disorder 12/01/2017    Osteoporosis 12/01/2017     PMH:  Past Medical History:   Diagnosis Date    Arrhythmia     MURMUR    Carcinoma in situ of cervix 2022    Celiac disease     SY (generalized anxiety disorder) Hashimoto's disease     Mitral regurgitation     Osteoporosis     Osteoporosis       PSH:  Past Surgical History:   Procedure Laterality Date    COLONOSCOPY N/A 2019    COLONOSCOPY [facility] EMR performed by Mic Zamarripa MD at [facility] ENDOSCOPY    COLONOSCOPY N/A 2019    COLONOSCOPY performed by Mic Zamarripa MD at [facility] ENDOSCOPY    HX CATARACT REMOVAL      HX CERVICAL POLYPECTOMY  2018    removed - Dr. Diane Martinez    HX GI      COLONOSCOPY    HX GYN      tubal ligation    HX GYN  10/2022    COLD KNIFE    HX HYSTERECTOMY  2022    Total laparoscopic hysterectomy with bilateral salpingo-oophorectomy, Dr. Milvia Giron    HX ORTHOPAEDIC Right 2021    ROTATOR CUFF REPAIR    HX OTHER SURGICAL  2017    Left Inguinal Node Biopsy    HX POLYPECTOMY        Social History:  Social History     Tobacco Use    Smoking status: Former     Packs/day: 1.00     Years: 28.00     Pack years: 28.00     Types: Cigarettes     Quit date:      Years since quittin.0    Smokeless tobacco: Never   Substance Use Topics    Alcohol use: No      Family History:  Family History   Problem Relation Age of Onset    OSTEOARTHRITIS Mother     Dementia Mother     Cancer Father         gleoblastoma- brain    Breast Cancer Sister 58    Thyroid Disease Sister     Lupus Sister     Lupus Sister     Thyroid Disease Sister     Thyroid Disease Sister     No Known Problems Sister     Dementia Brother     No Known Problems Brother     Breast Cancer Maternal Grandmother     Diabetes Maternal Grandmother     Cancer Maternal Grandfather         liver    No Known Problems Paternal Grandmother     No Known Problems Paternal Grandfather     Other Daughter         PCOS    Anesth Problems Neg Hx       Medications: (reviewed)  Current Outpatient Medications   Medication Sig    levothyroxine (SYNTHROID) 50 mcg tablet Take  by mouth Daily (before breakfast).  ON Monday, Tuesday, Wednesday, Thursday,Friday AND     levothyroxine (SYNTHROID) 50 mcg tablet Take 100 mcg by mouth Daily (before breakfast). ON SATURDAY    Omega-3 Fatty Acids 60- mg cpDR Take  by mouth daily. ascorbic acid, vitamin C, (VITAMIN C) 500 mg tablet Take 500 mg by mouth daily. turmeric 400 mg cap Take  by mouth daily. grape seed extract (GRAPE SEED PO) Take  by mouth daily. vitamin J-Z-N-lutein-minerals (OCUVITE) tablet 1 Tablet daily. aspirin 81 mg chewable tablet Take 81 mg by mouth daily. calcium carbonate (CALCIUM 600 PO) Take 600 mg by mouth daily. mv-min/iron/folic/calcium/vitK (WOMEN'S MULTIVITAMIN PO) Take  by mouth. No current facility-administered medications for this visit. Allergies: (reviewed)  Allergies   Allergen Reactions    Penicillins Hives     Patient screened for any delayed non-IgE-mediated reaction to PCN. Patient notes the following:    No delayed non-IgE-mediated reaction to PCN             Cefoxitin Hives and Itching    Clindamycin Hives    Gluten Unknown (comments)    Wheat Unknown (comments)     Barely, rye (Celiac)          OBJECTIVE:  Physical Exam:  Visit Vitals  /74 (BP 1 Location: Left upper arm, BP Patient Position: Sitting)   Pulse 88   Ht 5' (1.524 m)   Wt 123 lb 9.6 oz (56.1 kg)   BMI 24.14 kg/m²      General: Alert and oriented. No acute distress. Well-nourished  Gastrointestinal: soft, non-tender, non-distended, no masses or organomegaly. Well-healed incision. Musculoskeletal: normal gait. No joint tenderness, deformity or swelling. No muscular tenderness. Extremities: extremities normal, atraumatic, no cyanosis or edema. Pelvic: exam chaperoned by nurse. Normal appearing external genitalia. On speculum exam, the vaginal cuff is intact and healing well. On bimanual, the uterus and cervix are surgically absent. Vaginal cuff intact without defect. No evidence of masses or nodularity on bimanual exam. Deferred rectovaginal exam.   Neuro: Grossly intact. Normal gait and movement.  No acute deficit  Skin: No evidence of rashes or skin changes. IMPRESSION/PLAN:    Ms. Nathaniel Mandel is a 60 y.o. female who presented with cervical AIS. On 10/28/2022 underwent Cold knife conization of cervix, Endocervical curettage. Final pathology consistent with AIS, but with positive margins. On 12/8/2022 underwent Total laparoscopic hysterectomy with bilateral salpingo-oophorectomy. Final pathology consistent with AIS, negative margins; no invasive cancer. 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 No further treatment required. She has healed well from surgery. RTC in 6 months for continued surveillance. All questions and concerns were addressed with the patient and she is comfortable with the plan. An electronic signature was used to authenticate this note.      Jani Perkins MD

## 2023-01-18 NOTE — PROGRESS NOTES
Post op Visit, surgery was on 12/8/22    1. Have you been to the ER, urgent care clinic since your last visit? Hospitalized since your last visit? Yes, surgery with Dr. Joellen Carr on 12/8/22    2. Have you seen or consulted any other health care providers outside of the [address] [place] [address] since your last visit? Include any pap smears or colon screening.    no

## 2023-05-29 RX ORDER — ASPIRIN 81 MG/1
81 TABLET, CHEWABLE ORAL DAILY
COMMUNITY

## 2023-05-29 RX ORDER — LEVOTHYROXINE SODIUM 0.05 MG/1
100 TABLET ORAL
COMMUNITY

## 2023-05-29 RX ORDER — ASCORBIC ACID 500 MG
500 TABLET ORAL DAILY
COMMUNITY

## 2023-06-20 ENCOUNTER — OFFICE VISIT (OUTPATIENT)
Age: 63
End: 2023-06-20
Payer: COMMERCIAL

## 2023-06-20 ENCOUNTER — HOSPITAL ENCOUNTER (OUTPATIENT)
Facility: HOSPITAL | Age: 63
Setting detail: SPECIMEN
Discharge: HOME | End: 2023-06-23
Payer: COMMERCIAL

## 2023-06-20 VITALS — SYSTOLIC BLOOD PRESSURE: 116 MMHG | DIASTOLIC BLOOD PRESSURE: 72 MMHG | BODY MASS INDEX: 24.02 KG/M2 | WEIGHT: 123 LBS

## 2023-06-20 DIAGNOSIS — R87.618 OTHER ABNORMAL CYTOLOGICAL FINDINGS ON SPECIMENS FROM CERVIX UTERI: ICD-10-CM

## 2023-06-20 DIAGNOSIS — Z01.419 ENCOUNTER FOR GYNECOLOGICAL EXAMINATION WITHOUT ABNORMAL FINDING: ICD-10-CM

## 2023-06-20 DIAGNOSIS — Z12.31 SCREENING MAMMOGRAM FOR BREAST CANCER: Primary | ICD-10-CM

## 2023-06-20 PROCEDURE — 88175 CYTOPATH C/V AUTO FLUID REDO: CPT

## 2023-06-20 PROCEDURE — 87624 HPV HI-RISK TYP POOLED RSLT: CPT

## 2023-06-20 PROCEDURE — 99396 PREV VISIT EST AGE 40-64: CPT | Performed by: OBSTETRICS & GYNECOLOGY

## 2023-06-20 NOTE — PROGRESS NOTES
Annual exam    Litzy Ramirez is a 60 y.o. postmenopausal female presenting for annual exam. Doing well. Her main concerns today include surgical follow up for carcinoma in situ of cervix. Due for 6 month pap. On 10/28/2022 underwent Cold knife conization of cervix, Endocervical curettage. Final pathology consistent with AIS, but with positive margins. On 2022 underwent Total laparoscopic hysterectomy with bilateral salpingo-oophorectomy with Dr. Cecily Singh. Final pathology consistent with AIS, negative margins; no invasive cancer. Pt is otherwise doing well. PMH of hypothyroid, celiac, colon polyp, and osteoporosis. 7 grandchildren. She referred one of her daughters, Denisse Lane, as a pt.      Ob/Gyn Hx:   A0 -  x4  Menopause- age 38  PMB-denies  VMS-denies  STI- denies  ? SA- not currently     Health maintenance:  Pap- 2022 Pap NILM HPV+ (HR subtype 18/45+), ECC - concerning for adenocarcinoma in situ --> s/p CKC followed by [facility]/BSO --> due for pap of cuff today  Mammo- 2022 BI-RADS 1: Negative  Colonoscopy- 19 polyp --> scheduled for Oct 2023  Dexa - last 2019 h/o osteoporosis, was on fosamax, ca/vitD, wt bearing exercise       Past Medical History:   Diagnosis Date    Arrhythmia     MURMUR    Carcinoma in situ of cervix     Celiac disease     ABI (generalized anxiety disorder)     Hashimoto's disease     Mitral regurgitation     Osteoporosis     Osteoporosis        Past Surgical History:   Procedure Laterality Date    CATARACT REMOVAL      CERVICAL POLYP REMOVAL      removed - Dr. Aga Brar    COLONOSCOPY N/A 2019    COLONOSCOPY [facility] EMR performed by Alberto Yanes MD at [facility] ENDOSCOPY    COLONOSCOPY N/A 2019    COLONOSCOPY performed by Alberto Yanes MD at [facility] ENDOSCOPY    GI      COLONOSCOPY    GYN  10/2022    COLD KNIFE    GYN      tubal ligation    HYSTERECTOMY (CERVIX STATUS UNKNOWN)  2022    Total laparoscopic hysterectomy with

## 2023-09-28 ENCOUNTER — HOSPITAL ENCOUNTER (OUTPATIENT)
Facility: HOSPITAL | Age: 63
Discharge: HOME | End: 2023-09-28
Attending: OBSTETRICS & GYNECOLOGY
Payer: COMMERCIAL

## 2023-09-28 DIAGNOSIS — Z12.31 SCREENING MAMMOGRAM FOR BREAST CANCER: ICD-10-CM

## 2023-09-28 PROCEDURE — 77063 BREAST TOMOSYNTHESIS BI: CPT

## (undated) DEVICE — CONNECTOR TBNG AUX H2O JET DISP FOR OLY 160/180 SER

## (undated) DEVICE — BW-412T DISP COMBO CLEANING BRUSH: Brand: SINGLE USE COMBINATION CLEANING BRUSH

## (undated) DEVICE — Z DISCONTINUED NO SUB IDED SET EXTN W/ 4 W STPCOCK M SPIN LOK 36IN

## (undated) DEVICE — Device: Brand: DISPOSABLE ELECTROSURGICAL SNARE

## (undated) DEVICE — SOLUTION IRRIG 1000ML 0.9% SOD CHL USP POUR PLAS BTL

## (undated) DEVICE — SCISSORS ENDOSCP DIA5MM CRV MPLR CAUT W/ RATCH HNDL

## (undated) DEVICE — QUILTED PREMIUM COMFORT UNDERPAD,EXTRA HEAVY: Brand: WINGS

## (undated) DEVICE — TUBING HYDR IRR --

## (undated) DEVICE — GLOVE SURG SZ 75 L12IN FNGR THK79MIL GRN LTX FREE

## (undated) DEVICE — GYN LAPAROSCOPY - SMH: Brand: MEDLINE INDUSTRIES, INC.

## (undated) DEVICE — SYR 10ML LUER LOK 1/5ML GRAD --

## (undated) DEVICE — PAD PT POS 36 IN SURGYPAD DISP

## (undated) DEVICE — PENCIL SMK EVAC 10 FT BLADE ELECTRD ROCKER FOR TELSCP

## (undated) DEVICE — TROCARS: Brand: KII® BALLOON BLUNT TIP SYSTEM

## (undated) DEVICE — SYRINGE MED 50ML LUERLOCK TIP

## (undated) DEVICE — SNARE ENDOSCP M L240CM W27MM SHTH DIA2.4MM CHN 2.8MM HEX

## (undated) DEVICE — DEVICE RELD SZ 0 L8IN GRN ABSRB FOR ENDO SILS STIT DEVS

## (undated) DEVICE — Device

## (undated) DEVICE — MARKER,SKIN,WI/RULER AND LABELS: Brand: MEDLINE

## (undated) DEVICE — TROCAR: Brand: KII FIOS FIRST ENTRY

## (undated) DEVICE — COVER,TABLE,60X90,STERILE: Brand: MEDLINE

## (undated) DEVICE — FORCEPS BX L240CM JAW DIA2.8MM L CAP W/ NDL MIC MESH TOOTH

## (undated) DEVICE — LEGGINGS, PAIR, 31X48, STERILE: Brand: MEDLINE

## (undated) DEVICE — TROCAR ENDOSCP L100MM DIA5MM BLDELSS STBL SL THRD OPT VW

## (undated) DEVICE — SPONGE GZ W4XL4IN COT RADPQ HIGHLY ABSRB

## (undated) DEVICE — SYRINGE MED 20ML STD CLR PLAS LUERLOCK TIP N CTRL DISP

## (undated) DEVICE — Z DISCONTINUED USE 2751540 TUBING IRRIG L10IN DISP PMP ENDOGATOR

## (undated) DEVICE — SET ADMIN 16ML TBNG L100IN 2 Y INJ SITE IV PIGGY BK DISP

## (undated) DEVICE — DRAPE,LITHOTOMY,STERILE: Brand: MEDLINE

## (undated) DEVICE — GLOVE ORANGE PI 7 1/2   MSG9075

## (undated) DEVICE — CATH IV AUTOGRD BC BLU 22GA 25 -- INSYTE

## (undated) DEVICE — SCALPEL SURG SZ 11 POLYMER COAT SFTY DISPOSABLE

## (undated) DEVICE — GEL SUBMUCOSAL LIFT AGNT -- ORISE

## (undated) DEVICE — SPONGE HEMOSTAT CELLULS 4X8IN -- SURGICEL

## (undated) DEVICE — TROCAR ENDOSCP L100MM DIA5MM BLDELSS STBL SL OBT RADLUC

## (undated) DEVICE — SWAB MEDICATED 8 IN PROCTO

## (undated) DEVICE — SUT SLK 2-0SH 30IN BLK --

## (undated) DEVICE — NEEDLE HYPO 18GA L1.5IN PNK S STL HUB POLYPR SHLD REG BVL

## (undated) DEVICE — GARMENT,MEDLINE,DVT,INT,CALF,MED, GEN2: Brand: MEDLINE

## (undated) DEVICE — KENDALL RADIOLUCENT FOAM MONITORING ELECTRODE -RECTANGULAR SHAPE: Brand: KENDALL

## (undated) DEVICE — ENDO CARRY-ON PROCEDURE KIT INCLUDES ENZYMATIC SPONGE, GAUZE, BIOHAZARD LABEL, TRAY, LUBRICANT, DIRTY SCOPE LABEL, WATER LABEL, TRAY, DRAWSTRING PAD, AND DEFENDO 4-PIECE KIT.: Brand: ENDO CARRY-ON PROCEDURE KIT

## (undated) DEVICE — SUTURE VCRL SZ 0 L36IN ABSRB VLT L36MM CT-1 1/2 CIR J346H

## (undated) DEVICE — SUTURING DEVICE: Brand: ENDO STITCH

## (undated) DEVICE — GLOVE ORANGE PI 7   MSG9070

## (undated) DEVICE — INTENDED FOR TISSUE SEPARATION, AND OTHER PROCEDURES THAT REQUIRE A SHARP SURGICAL BLADE TO PUNCTURE OR CUT.: Brand: BARD-PARKER ® CARBON RIB-BACK BLADES

## (undated) DEVICE — SYSTEM EVAC SMOKE LAPARSCOPIC

## (undated) DEVICE — CANN NASAL O2 CAPNOGRAPHY AD -- FILTERLINE

## (undated) DEVICE — 1200 GUARD II KIT W/5MM TUBE W/O VAC TUBE: Brand: GUARDIAN

## (undated) DEVICE — REM POLYHESIVE ADULT PATIENT RETURN ELECTRODE: Brand: VALLEYLAB

## (undated) DEVICE — SEALER TISS L37CM SHFT DIA5MM 360DEG ROT CVD JAW TAPR TIP

## (undated) DEVICE — SUTURE SZ 0 27IN 5/8 CIR UR-6  TAPER PT VIOLET ABSRB VICRYL J603H

## (undated) DEVICE — BASIN ST MAJOR-NO CAUTERY: Brand: MEDLINE INDUSTRIES, INC.

## (undated) DEVICE — COVER LT HNDL PLAS RIG 1 PER PK

## (undated) DEVICE — CLIP MED L235CM L2.8MM 11MM OPN HEMSTAT FIX RESOL

## (undated) DEVICE — AIRLIFE™ U/CONNECT-IT OXYGEN TUBING 7 FEET (2.1 M) CRUSH-RESISTANT OXYGEN TUBING, VINYL TIPPED: Brand: AIRLIFE™

## (undated) DEVICE — SHEET,DRAPE,UNDERBUTTOCK,GRAD POUCH,PORT: Brand: MEDLINE

## (undated) DEVICE — SOLIDIFIER FLUID 3000 CC ABSORB

## (undated) DEVICE — SUTURE VCRL SZ 0 L36IN ABSRB UD L36MM CT-1 1/2 CIR J946H

## (undated) DEVICE — GOWN,SIRUS,NONRNF,SETINSLV,XL,20/CS: Brand: MEDLINE

## (undated) DEVICE — TROCAR: Brand: KII OPTICAL ACCESS SYSTEM

## (undated) DEVICE — PREMIUM WET SKIN PREP TRAY: Brand: MEDLINE INDUSTRIES, INC.

## (undated) DEVICE — AGENT HEMSTAT W4XL4IN OXIDIZED REGENERATED CELOS ABSRB SFT

## (undated) DEVICE — MANIPULATOR UTER 3CM ULTEM PLAS CUP DELINEATOR FOR USE W/

## (undated) DEVICE — PAD,SANITARY,11 IN,MAXI,N-STRL,IND WRAP: Brand: MEDLINE

## (undated) DEVICE — BAG BELONG PT PERS CLEAR HANDL

## (undated) DEVICE — Device: Brand: MEDICAL ACTION INDUSTRIES